# Patient Record
Sex: FEMALE | Race: WHITE | URBAN - METROPOLITAN AREA
[De-identification: names, ages, dates, MRNs, and addresses within clinical notes are randomized per-mention and may not be internally consistent; named-entity substitution may affect disease eponyms.]

---

## 2018-04-16 ASSESSMENT — ENCOUNTER SYMPTOMS
SHORTNESS OF BREATH: 0
MUSCLE CRAMPS: 0
DYSPNEA ON EXERTION: 0
SPUTUM PRODUCTION: 1
HOARSE VOICE: 0
TASTE DISTURBANCE: 0
POSTURAL DYSPNEA: 0
MUSCLE WEAKNESS: 0
SMELL DISTURBANCE: 0
WHEEZING: 0
MYALGIAS: 1
NECK MASS: 0
HEMOPTYSIS: 0
TROUBLE SWALLOWING: 0
SNORES LOUDLY: 0
JOINT SWELLING: 0
SINUS PAIN: 0
COUGH DISTURBING SLEEP: 0
SINUS CONGESTION: 1
BACK PAIN: 0
COUGH: 1
STIFFNESS: 1
NECK PAIN: 1
ARTHRALGIAS: 0

## 2018-04-18 ENCOUNTER — OFFICE VISIT (OUTPATIENT)
Dept: SURGERY | Facility: CLINIC | Age: 33
End: 2018-04-18
Payer: COMMERCIAL

## 2018-04-18 ENCOUNTER — APPOINTMENT (OUTPATIENT)
Dept: SURGERY | Facility: CLINIC | Age: 33
End: 2018-04-18
Payer: COMMERCIAL

## 2018-04-18 ENCOUNTER — ANESTHESIA EVENT (OUTPATIENT)
Dept: SURGERY | Facility: AMBULATORY SURGERY CENTER | Age: 33
End: 2018-04-18

## 2018-04-18 ENCOUNTER — ALLIED HEALTH/NURSE VISIT (OUTPATIENT)
Dept: SURGERY | Facility: CLINIC | Age: 33
End: 2018-04-18
Payer: COMMERCIAL

## 2018-04-18 ENCOUNTER — OFFICE VISIT (OUTPATIENT)
Dept: ORTHOPEDICS | Facility: CLINIC | Age: 33
End: 2018-04-18
Payer: COMMERCIAL

## 2018-04-18 VITALS
RESPIRATION RATE: 18 BRPM | DIASTOLIC BLOOD PRESSURE: 74 MMHG | BODY MASS INDEX: 23.78 KG/M2 | SYSTOLIC BLOOD PRESSURE: 122 MMHG | TEMPERATURE: 98.1 F | WEIGHT: 148 LBS | HEART RATE: 63 BPM | OXYGEN SATURATION: 100 % | HEIGHT: 66 IN

## 2018-04-18 VITALS — HEIGHT: 66 IN | WEIGHT: 150.6 LBS | BODY MASS INDEX: 24.2 KG/M2

## 2018-04-18 DIAGNOSIS — M79.89 SOFT TISSUE MASS: Primary | ICD-10-CM

## 2018-04-18 DIAGNOSIS — Z01.818 PREOP EXAMINATION: Primary | ICD-10-CM

## 2018-04-18 DIAGNOSIS — M79.89 SOFT TISSUE MASS: ICD-10-CM

## 2018-04-18 PROBLEM — M20.42 HAMMER TOE OF LEFT FOOT: Status: ACTIVE | Noted: 2017-02-16

## 2018-04-18 PROBLEM — D36.10 NEUROMA: Status: ACTIVE | Noted: 2017-02-16

## 2018-04-18 RX ORDER — VITAMIN E 268 MG
1000 CAPSULE ORAL AT BEDTIME
COMMUNITY

## 2018-04-18 RX ORDER — IBUPROFEN 400 MG/1
400 TABLET, FILM COATED ORAL PRN
COMMUNITY
Start: 2017-04-05

## 2018-04-18 RX ORDER — SCOLOPAMINE TRANSDERMAL SYSTEM 1 MG/1
1 PATCH, EXTENDED RELEASE TRANSDERMAL ONCE
Status: CANCELLED | OUTPATIENT
Start: 2018-04-18 | End: 2018-04-18

## 2018-04-18 ASSESSMENT — LIFESTYLE VARIABLES: TOBACCO_USE: 0

## 2018-04-18 ASSESSMENT — ENCOUNTER SYMPTOMS: ORTHOPNEA: 0

## 2018-04-18 NOTE — PATIENT INSTRUCTIONS
Preparing for Your Surgery      Name:  Lucy Benson   MRN:  [id number]   :  1985   Today's Date:  2018     Arriving for surgery:  Surgery date:  18  Arrival time:  10:20 am    Please come to:   [facility]  [address] [place] [phone]     Parking is available in front of the Clinics and Surgery Center building from 5:30AM to 8:00PM.  -  Proceed to the 5th floor to check into the Ambulatory Surgery Center.              >> There will be patient concierges on the 1st and 5th floor, for assistance or an escort, if you would like.              >> Please call [phone] with any questions.  -  Bring your ID and insurance card.    What can I eat or drink?  -  You may have solid food or milk products until 8 hours prior to your surgery. (3:50 am )  -  You may have water, apple juice or 7up/Sprite until 2 hours prior to your surgery. (9:50 am )    Which medicines can I take?       -  Do not bring your own medications to the hospital.        -  Follow Orthopedic Clinic instructions regarding Ibuprofen. If no instructions given, NO Ibuprofen the day prior to surgery.     -  Do NOT take these medications in the morning, the day of surgery:  Vitamin D3    -  Please take these medications the morning of surgery:  Acetaminophen (Tylenol) if needed    How do I prepare myself?  -  Take two showers: one the night before surgery; and one the morning of surgery.         Use Scrubcare or Hibiclens to wash from neck down.  You may use your own shampoo and conditioner. No other hair products.   -  Do NOT use lotion, powder, colognes, deodorant, or antiperspirant the day of your surgery.  -  Do NOT wear any makeup, fingernail polish or jewelry.    Questions or Concerns:  If you have questions or concerns, please call the  Preoperative Assessment Center, Monday-Friday 7AM-7PM:  [phone]    AFTER YOUR SURGERY  Breathing exercises   Breathing exercises help you recover  faster. Take deep breaths and let the air out slowly. This will:     Help you wake up after surgery.    Help prevent complications like pneumonia.  Preventing complications will help you go home sooner.   Nausea and vomiting   You may feel sick to your stomach after surgery; if so, let your nurse know.    Pain control:  After surgery, you may have pain. Our goal is to help you manage your pain. Pain medicine will help you feel comfortable enough to do activities that will help you heal.  These activities may include breathing exercises, walking and physical therapy.   To help your health care team treat your pain we will ask: 1) If you have pain  2) where it is located 3) describe your pain in your words  Methods of pain control include medications given by mouth, vein or by nerve block for some surgeries.  Sequential Compression Device (SCD) or Pneumo Boots:  You may need to wear SCD S on your legs or feet. These are wraps connected to a machine that pumps in air and releases it. The repeated pumping helps prevent blood clots from forming.

## 2018-04-18 NOTE — PROGRESS NOTES
Orthopedic Surgery Consult / History and Physical    Primary care provider: Mohini Santos           Chief Concern:   Right calf lesion           History of Present Illness:   This patient is a 32 year old female with history of rheumatoid arthritis who presents for evaluation of the above.    Reports insidious onset pain in January of this year.  The pain slowly progressed up until the end of March.  She sought physician care at that time.  She had an ultrasound of her calf at that point which did show a hypoechoic structure approximately 1 cm in size in the medial aspect of the lateral gastrocnemius head.  She reports that the pain got worse for a period of days after that, however that improved.  The pain has been at a much lower level since that time.  The lesion was subsequently categorized with an MRI which was nonspecific and the patient was subsequently referred to our clinic for evaluation.    Mild pain, pain is been stable.  Does not limit her activities in any ways.  Is mildly more sore after she does activities such as stair climbing or extended walking.  Achy in nature.  Not taking any pain medication at present.                 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    Social History   Substance Use Topics     Smoking status: Never Smoker     Smokeless tobacco: Never Used     Alcohol use No            Family History:   Denies family history of bleeding or clotting disorders  Family History   Problem Relation Age of Onset     DIABETES Father      TYPE 2     Obesity Father      Hypertension Mother      Other Cancer Other      Rheumatoid Arthritis Maternal Grandmother             Allergies:   No Known Allergies         Medications:    Anticoagulants: None  Current Outpatient Prescriptions   Medication     Cholecalciferol (VITAMIN D3) 1000 units CAPS     Flaxseed, Linseed, (FLAXSEED OIL) 1000 MG CAPS     ibuprofen (ADVIL/MOTRIN) 400 MG tablet     No current facility-administered medications for this visit.               Physical Exam:   General: awake, alert, cooperative, no apparent distress, appears stated age  HEENT: normal  Respiratory: breathing non-labored  Cardiovascular: peripheral pulses palpable and symmetric, capillary refill < 2sec  Skin: no rashes or lesions  Neurological: CN II-XII grossly intact  Musculoskeletal:     RLE   Skin C/D/I   No visible or palpable masses along the posterior aspect of the leg   No sites of any significant tenderness throughout both gastrocnemius heads   Motor: EHL, TA, FHL, GS 5/5   SILT on SP, DP, S, S, and T nerve territories   Circulation: palpable DP and TP, foot warm   No pain with plantarflexion and knee extension             Data:   Imagin2018 MRI right leg independently reviewed.  This does show a 6 mm homogenously enhancing lesion in the lateral head of the gastrocnemius without significant soft tissue reaction around it.  Fatty infiltration around the lesion. Hemangioma likely diagnosis.             Assessment and Plan:   Assessment:  32-year-old female with a right leg lateral gastroc head likely hemangioma.     Plan:  1. We discussed the options would be either observation with a repeat MRI in 3-4 months, or alternatively an excisional biopsy.  2. The patient reports that for her peace of mind as well as the consistent discomfort bothers her, she would prefer removal.  We will endeavor to set this up in the near future.            Seen with Dr. Avery, documentation by:    Lexx Brown MD  Orthopaedic Surgery PGY-4        Answers for HPI/ROS submitted by the patient on 2018   General Symptoms: No  Skin Symptoms: No  HENT Symptoms: Yes  EYE SYMPTOMS: No  HEART SYMPTOMS: No  LUNG  SYMPTOMS: Yes  INTESTINAL SYMPTOMS: No  URINARY SYMPTOMS: No  GYNECOLOGIC SYMPTOMS: No  BREAST SYMPTOMS: No  SKELETAL SYMPTOMS: Yes  BLOOD SYMPTOMS: No  NERVOUS SYSTEM SYMPTOMS: No  MENTAL HEALTH SYMPTOMS: No  Ear pain: Yes  Ear discharge: No  Hearing loss: No  Tinnitus: Yes  Nosebleeds: No  Congestion: Yes  Sinus pain: No  Trouble swallowing: No   Voice hoarseness: No  Mouth sores: No  Tooth pain: No  Gum tenderness: No  Bleeding gums: No  Change in taste: No  Change in sense of smell: No  Dry mouth: No  Hearing aid used: No  Neck lump: No  Cough: Yes  Sputum or phlegm: Yes  Coughing up blood: No  Difficulty breating or shortness of breath: No  Snoring: No  Wheezing: No  Difficulty breathing on exertion: No  Nighttime Cough: No  Difficulty breathing when lying flat: No  Back pain: No  Muscle aches: Yes  Neck pain: Yes  Swollen joints: No  Joint pain: No  Bone pain: No  Muscle cramps: No  Muscle weakness: No  Joint stiffness: Yes  Bone fracture: No

## 2018-04-18 NOTE — NURSING NOTE
"Reason For Visit:   Chief Complaint   Patient presents with     Consult     Right Calf Mass.        Pain Assessment  Patient Currently in Pain: Yes  0-10 Pain Scale: 2  Primary Pain Location: Leg  Pain Orientation: Right  Pain Descriptors: Discomfort  Alleviating Factors: Rest, NSAIDS  Aggravating Factors: Movement, Standing, Walking               HEIGHT: 5' 6\", WEIGHT: 150 lbs 9.6 oz, BMI: Body mass index is 24.31 kg/(m^2).      Current Outpatient Prescriptions   Medication Sig Dispense Refill     Cholecalciferol (VITAMIN D3) 1000 units CAPS Take 1,000 Units by mouth       Flaxseed, Linseed, (FLAXSEED OIL) 1000 MG CAPS Take 1,000 mg by mouth       ibuprofen (ADVIL/MOTRIN) 400 MG tablet Take 400 mg by mouth          No Known Allergies            "

## 2018-04-18 NOTE — MR AVS SNAPSHOT
After Visit Summary   2018    Lucy Benson    MRN: [id number]           Patient Information     Date Of Birth          1985        Visit Information        Provider Department      2018 12:30 PM Rn, [facility] Preoperative Assessment Center        Care Instructions    Preparing for Your Surgery      Name:  Lucy Benson   MRN:  [id number]   :  1985   Today's Date:  2018     Arriving for surgery:  Surgery date:  18  Arrival time:  10:20 am    Please come to:   [facility]  [address] [place] [phone]    [employer] Parking is available in front of the Clinics and Surgery Center building from 5:30AM to 8:00PM.  -  Proceed to the 5th floor to check into the Ambulatory Surgery Center.              >> There will be patient concierges on the 1st and 5th floor, for assistance or an escort, if you would like.              >> Please call [phone] with any questions.  -  Bring your ID and insurance card.    What can I eat or drink?  -  You may have solid food or milk products until 8 hours prior to your surgery. (3:50 am )  -  You may have water, apple juice or 7up/Sprite until 2 hours prior to your surgery. (9:50 am )    Which medicines can I take?       -  Do not bring your own medications to the hospital.        -  Follow Orthopedic Clinic instructions regarding Ibuprofen. If no instructions given, NO Ibuprofen the day prior to surgery.     -  Do NOT take these medications in the morning, the day of surgery:  Vitamin D3    -  Please take these medications the morning of surgery:  Acetaminophen (Tylenol) if needed    How do I prepare myself?  -  Take two showers: one the night before surgery; and one the morning of surgery.         Use Scrubcare or Hibiclens to wash from neck down.  You may use your own shampoo and conditioner. No other hair products.   -  Do NOT use lotion, powder, colognes, deodorant, or antiperspirant the day  of your surgery.  -  Do NOT wear any makeup, fingernail polish or jewelry.    Questions or Concerns:  If you have questions or concerns, please call the  Preoperative Assessment Center, Monday-Friday 7AM-7PM:  [phone]    AFTER YOUR SURGERY  Breathing exercises   Breathing exercises help you recover faster. Take deep breaths and let the air out slowly. This will:     Help you wake up after surgery.    Help prevent complications like pneumonia.  Preventing complications will help you go home sooner.   Nausea and vomiting   You may feel sick to your stomach after surgery; if so, let your nurse know.    Pain control:  After surgery, you may have pain. Our goal is to help you manage your pain. Pain medicine will help you feel comfortable enough to do activities that will help you heal.  These activities may include breathing exercises, walking and physical therapy.   To help your health care team treat your pain we will ask: 1) If you have pain  2) where it is located 3) describe your pain in your words  Methods of pain control include medications given by mouth, vein or by nerve block for some surgeries.  Sequential Compression Device (SCD) or Pneumo Boots:  You may need to wear SCD S on your legs or feet. These are wraps connected to a machine that pumps in air and releases it. The repeated pumping helps prevent blood clots from forming.                     Follow-ups after your visit        Your next 10 appointments already scheduled     Apr 18, 2018 12:30 PM CDT   (Arrive by 12:15 PM)   PAC RN ASSESSMENT with Aury Pac Rn   [facility] Preoperative [facility] ([facility])    [address] [place] [id number] [phone]            Apr 18, 2018  1:00 PM CDT   (Arrive by 12:45 PM)   PAC Anesthesia Consult with Aury Pac Anesthesiologist   [facility] Preoperative Assessment Center ([facility])    [address] [place] [address]  [place] [id number] [phone]            Apr 18, 2018  1:15 PM CDT   LAB with  LAB   [facility] ([facility])    [address] [place] [address]  1st Floor  [facility] [address] [phone]           Please do not eat 10-12 hours before your appointment if you are coming in fasting for labs on lipids, cholesterol, or glucose (sugar). This does not apply to pregnant women. Water, hot tea and black coffee (with nothing added) are okay. Do not drink other fluids, diet soda or chew gum.            Apr 19, 2018   Procedure with Jose Avery MD   [facility] ([facility])    [address] [place] [address]  5th Floor  [facility] [address] [phone]           Located in the Clinics and Surgery Center at [address] [place] [address].   parking is very convenient and highly recommended.  is a $6 flat rate fee.  Both  and self parkers should enter the main arrival plaza from [facility]; parking attendants will direct you based on your parking preference.              Who to contact     Please call your clinic at [phone] to:    Ask questions about your health    Make or cancel appointments    Discuss your medicines    Learn about your test results    Speak to your doctor            Additional Information About Your Visit        [employer] Information     [employer] gives you secure access to your electronic health record. If you see a primary care provider, you can also send messages to your care team and make appointments. If you have questions, please call your primary care clinic.  If you do not have a primary care provider, please call [phone] and they will assist you.      [employer] is an electronic gateway that provides easy, online access to your medical records. With [employer], you can request a clinic appointment, read your test results, renew a prescription or communicate with your care team.     To  access your existing account, please contact your [facility] or call [phone] for assistance.        Care EveryWhere ID     This is your Care EveryWhere ID. This could be used by other organizations to access your [place] medical records  [id number]         Blood Pressure from Last 3 Encounters:   04/18/18 122/74    Weight from Last 3 Encounters:   04/18/18 67.1 kg (148 lb)   04/18/18 68.3 kg (150 lb 9.6 oz)              Today, you had the following     No orders found for display       Primary Care Provider Office Phone # Fax #    Mohini Santos [phone]       [facility] [address] [place] [address]        Equal Access to Services     MARK SHER : Hadii aad marlys hadasho Sokayali, waaxda luqadaha, qaybta kaalmada adeegyada, waxay annika berman . So [facility] [phone].    ATENCIÓN: Si habla español, tiene a white disposición servicios gratuitos de asistencia lingüística. [facility] [phone].    We comply with applicable federal civil rights laws and [place] laws. We do not discriminate on the basis of race, color, national origin, age, disability, sex, sexual orientation, or gender identity.            Thank you!     Thank you for choosing [facility] PREOPERATIVE [facility]ESSMENT [facility]ENTER  for your care. Our goal is always to provide you with excellent care. Hearing back from our patients is one way we can continue to improve our services. Please take a few minutes to complete the written survey that you may receive in the mail after your visit with us. Thank you!             Your Updated Medication List - Protect others around you: Learn how to safely use, store and throw away your medicines at www.disposemymeds.org.          This list is accurate as of 4/18/18 12:05 PM.  Always use your most recent med list.                   Brand Name Dispense Instructions for use Diagnosis    flaxseed oil 1000 MG Caps      Take 1,000 mg by mouth At  Bedtime        ibuprofen 400 MG tablet    ADVIL/MOTRIN     Take 400 mg by mouth as needed        MULTIVITAMIN ADULTS Tabs      Take 1 tablet by mouth At Bedtime        vitamin D3 1000 units Caps      Take 1,000 Units by mouth every morning

## 2018-04-18 NOTE — ANESTHESIA PREPROCEDURE EVALUATION
Anesthesia Evaluation     . Pt has had prior anesthetic. Type: General and MAC    History of anesthetic complications   - PONV and motion sickness        ROS/MED HX    ENT/Pulmonary:  - neg pulmonary ROS    (-) tobacco use and recent URI   Neurologic:     (+)neuropathy - Some numbness at the site of the soft tissue mass.  ,     Cardiovascular:  - neg cardiovascular ROS      (-) taking anticoagulants/antiplatelets, PIERRE and orthopnea/PND   METS/Exercise Tolerance:  4 - Raking leaves, gardening   Hematologic:  - neg hematologic  ROS       Musculoskeletal:   (+) arthritis, , , other musculoskeletal- Soft tissue mass over the right calf.        GI/Hepatic:  - neg GI/hepatic ROS       Renal/Genitourinary:  - ROS Renal section negative       Endo:  - neg endo ROS       Psychiatric:  - neg psychiatric ROS       Infectious Disease:  - neg infectious disease ROS       Malignancy:      - no malignancy   Other:    - neg other ROS                 Physical Exam  Normal systems: dental    Airway   Mallampati: I  TM distance: >3 FB  Neck ROM: full    Dental     Cardiovascular   Rhythm and rate: regular and normal  (-) no peripheral edema and no murmur    Pulmonary    breath sounds clear to auscultation    Other findings: 3/30/18: Na 138; K 4.2; Cl 104; Glu 81; BUN 9; Cr 0.72; Ca 10.2; Mag 2.3    3/30/18 Right Lower Extremity Soft Tissue Ultrasound:  FINDINGS: Real-time scanning in the area of pain in the posterior mid left calf shows a hypoechoic structure with in the gastrocnemius muscle. This measures approximately 1 cm and has suspicious features such as an irregular lobular  contour and a taller than wide architecture. Recommend further characterization with contrast-enhanced MRI.            No results found for: WBC, HGB, HCT, PLT, NA, POTASSIUM, CHLORIDE, CO2, BUN, CR, GLC, SED, DD, DIMER, NTBNPI, NTBNP, TROPONIN, TROPI, TROPR, TROPN, AST, ALT, GGT, ALKPHOS, BILITOTAL, BILIDIRECT, CAMPBELL, INR    PAC Discussion and  Assessment    ASA Classification: 2  Case is suitable for: ASC  Anesthetic techniques and relevant risks discussed: GA and GA with regional block for post-op pain control  Invasive monitoring and risk discussed: No  Types:   Possibility and Risk of blood transfusion discussed: No  NPO instructions given:   Additional anesthetic preparation and risks discussed:   Needs early admission to pre-op area:   Other:     PAC Resident/NP Anesthesia Assessment:  Lucy Benson is a 32 year old female scheduled to undergo Surgical Excision of Right Leg Soft Tissue Mass on 4/19/18 with Dr. Reid.    She has the following specific operative considerations:   1.  History of postoperative nausea/vomiting with motion sickness: Recommend use of antiemetic agents in the perioperative period.  Orders entered for a Scopolamine patch to be applied in Pre-Op.  2.  The patient is a relatively young, healthy, physically active, non-smoking female who is preparing for a relatively low-risk surgery.  No further evaluation needed prior to the planned procedure.    Revised Cardiac Risk Index: 0.4% risk of major adverse cardiac event.  VTE risk: 0.26%  AWA risk: Low  PONV risk score= 4.  (If > 2, anti-emetic intervention is recommended.)  Anesthesia considerations: Refer to PAC assessment in the anesthesia records.        Reviewed and Signed by PAC Mid-Level Provider/Resident  Mid-Level Provider/Resident: Caron Simon CNP  Date: 4/18/18  Time:     Attending Anesthesiologist Anesthesia Assessment:        Anesthesiologist:   Date:   Time:   Pass/Fail:   Disposition:     PAC Pharmacist Assessment:        Pharmacist:   Date:   Time:        Anesthesia Plan      History & Physical Review  History and physical reviewed and following examination; no interval change.    ASA Status:  1 .        Plan for General and LMA with Intravenous induction. Maintenance will be TIVA.    PONV prophylaxis:  Ondansetron (or other 5HT-3), Dexamethasone or Solumedrol  and Scopolamine patch    -TIVA, Zofran, Decadron, and Scopolamine patch for PONV      Susana Osborne MD  CA 3 Resident  Pager [phone]    I have reviewed the medical record and examined the patient  I agree with the plan of Dr. Osborne.    Maurisio Ypa MD      Postoperative Care  Postoperative pain management:  Multi-modal analgesia.      Consents  Anesthetic plan, risks, benefits and alternatives discussed with:  Patient..                          .

## 2018-04-18 NOTE — H&P
Pre-Operative H & P     Date of Encounter: 4/18/2018  Primary Care Physician:  Mohini Santos    CC: Right calf soft tissue mass.    HPI:  Lucy Benson is a 32 year old female who presents for pre-operative H & P in preparation for Surgical Excision of Right Leg Soft Tissue Mass on 4/19/18 with Dr. Reid at [facility].   The patient was evaluated by Dr. Avery on 4/18/18 in regards to a right calf mass.  She first noted pain in her right calf in January of this year.  She was evaluated by Primary Care, and ultrasound showed a hypoechoic structure approximately 1 cm in size in the medial aspect of the lateral gastrocnemius head.  This was further evaluated by MRI.  She was then referred to Orthopedic Surgery.  She reported that she continues to experience mild pain, somewhat worse after extended walking or stair climbing.  She was offered continued observation with repeat MRI in 3-4 months, or excisional biopsy.  She has decided to proceed with surgery and arrangements are being made for the above procedure.    History is obtained from the patient and the medical record.    Past Medical History:  Past Medical History:   Diagnosis Date     History of ectopic pregnancy      PONV (postoperative nausea and vomiting)      RA (rheumatoid arthritis) (H) 2013     Soft tissue mass     Right leg.     Past Surgical History:  Past Surgical History:   Procedure Laterality Date     DENTAL SURGERY      [place] teeth extraction     DENTAL SURGERY      Gingival surgery     ENT SURGERY      Septum implant     FOOT SURGERY      Right foot hammertoe repair     ORTHOPEDIC SURGERY      ORIF Right humerus fracture.     Hx of Blood transfusions/reactions: Denies.    Hx of abnormal bleeding or anti-platelet use: Denies.    Menstrual history: Patient's last menstrual period was 03/27/2018 (exact date).    Steroid use in the last year: Denies.    Personal or FH of difficulty with anesthesia: PONV, motion  sickness.    Prior to admission medications  Current Outpatient Prescriptions   Medication Sig Dispense Refill     Cholecalciferol (VITAMIN D3) 1000 units CAPS Take 1,000 Units by mouth every morning        Flaxseed, Linseed, (FLAXSEED OIL) 1000 MG CAPS Take 1,000 mg by mouth At Bedtime        Multiple Vitamins-Minerals (MULTIVITAMIN ADULTS) TABS Take 1 tablet by mouth At Bedtime       ibuprofen (ADVIL/MOTRIN) 400 MG tablet Take 400 mg by mouth as needed        Allergies  No Known Allergies    Social History  Social History     Social History     Marital status: Single     Spouse name: N/A     Number of children: N/A     Years of education: N/A     Occupational History     Not on file.     Social History Main Topics     Smoking status: Never Smoker     Smokeless tobacco: Never Used     Alcohol use No     Drug use: No     Sexual activity: No     Other Topics Concern     Not on file     Social History Narrative     Family History  Family History   Problem Relation Age of Onset     DIABETES Father      TYPE 2     Obesity Father      Hypertension Mother      Rheumatoid Arthritis Maternal Grandmother      No Known Problems Brother      No Known Problems Maternal Grandfather      Lung Cancer Paternal Grandmother      Medical History Unknown Paternal Grandfather      Review of Systems  Functional status: Independent in ADL's.  4 METS.     The complete review of systems is negative other than noted in the HPI or here.   Constitutional: Denies recent changes in weight, sleeping patterns, or fevers/chills.  Eyes: Glasses for vision correction.  No recent vision changes.  EENT: Denies recent changes in hearing, mouth pain, or difficulty swallowing.  Cardiovascular: Denies chest pain, PIERRE or orthopnea, or palpitations.  Respiratory: Denies shortness of breath or significant cough.    GI: Denies nausea/vomiting or diarrhea/constipation.    : Denies dysuria.    Musculoskeletal: Denies joint pain or swelling.    Skin: Denies  "rashes or wounds.    Hematologic: Denies easy bruising or bleeding.    Neurologic: Denies migraines, seizures, dizziness.  Reports slight numbness at the site of the right calf soft tissue mass  Psychiatric: Denies changes in mood or affect.      /74 (BP Location: Right arm, Patient Position: Sitting, Cuff Size: Adult Regular)  Pulse 63  Temp 98.1  F (36.7  C) (Oral)  Resp 18  Ht 1.676 m (5' 6\")  Wt 67.1 kg (148 lb)  LMP 03/27/2018 (Exact Date)  SpO2 100%  BMI 23.89 kg/m2    148 lbs 0 oz  5' 6\"   Body mass index is 23.89 kg/(m^2).    Physical Exam  Constitutional: Patient awake, seated upright in a chair, in no apparent distress.  Appears stated age.  Eyes: Pupils equal, round and reactive to light.  Extra ocular muscles intact. Sclera clear.  Conjunctiva normal.  HENT: Head normocephalic.  Oral pharynx intact with moist mucous membranes.  Dentition intact.  No thyromegaly appreciated.   Respiratory: Lung sounds clear to auscultation bilaterally.  No rales, rhonchi, or wheezing noted.    Cardiovascular: S1, S2, regular rate and rhythm.  No murmurs, rubs, or gallops noted. Radial and pedal pulses palpable, bilaterally.  No edema noted.   GI: Bowel sounds present.  Abdomen flat, soft, non-tender to light palpation.  No hepatosplenomegaly or masses palpated.   Genitourinary: Exam deferred.  Lymph/Hematologic: No cervical or supraclavicular lymphadenopathy noted.  No excessive bruising noted.    Skin: Color appropriate for race, warm, dry.  No rashes or wounds at anticipated surgical site.   Musculoskeletal: Full extension of the neck.  No redness, warmth, or swelling of the joints noted. Gross motor strength is normal.    Neurologic: Alert, oriented to name, place and time. Cranial nerves II-XII are grossly intact. Gait is normal.      Neuropsychiatric: Calm, cooperative. Normal affect.     Labs:  3/30/18: Na 138; K 4.2; Cl 104; Glu 81; BUN 9; Cr 0.72; Ca 10.2; Mag 2.3    Imaging:  3/30/18 Right Lower " Extremity Soft Tissue Ultrasound:  FINDINGS: Real-time scanning in the area of pain in the posterior mid left calf shows a hypoechoic structure with in the gastrocnemius muscle. This measures approximately 1 cm and has suspicious features such as an irregular lobular  contour and a taller than wide architecture. Recommend further characterization with contrast-enhanced MRI.    Outside records from [facility] reviewed.    Assessment and Plan  Lucy Benson is a 32 year old female scheduled to undergo Surgical Excision of Right Leg Soft Tissue Mass on 4/19/18 with Dr. Reid.    She has the following specific operative considerations:   1.  History of postoperative nausea/vomiting with motion sickness: Recommend use of antiemetic agents in the perioperative period.  Orders entered for a Scopolamine patch to be applied in Pre-Op.  2.  The patient is a relatively young, healthy, physically active, non-smoking female who is preparing for a relatively low-risk surgery.  No further evaluation needed prior to the planned procedure.    Revised Cardiac Risk Index: 0.4% risk of major adverse cardiac event.  VTE risk: 0.26%  AWA risk: Low  PONV risk score= 4.  (If > 2, anti-emetic intervention is recommended.)  Anesthesia considerations: Refer to PAC assessment in the anesthesia records.    Caron Simon NP  Preoperative Assessment Center  [facility]  Phone: [phone]  Fax: [phone]

## 2018-04-18 NOTE — MR AVS SNAPSHOT
After Visit Summary   4/18/2018    Lucy Benson    MRN: [id number]           Patient Information     Date Of Birth          1985        Visit Information        Provider Department      4/18/2018 10:15 AM Jose Avery MD [facility]        Today's Diagnoses     Soft tissue mass    -  1       Follow-ups after your visit        Your next 10 appointments already scheduled     Apr 18, 2018 11:30 AM CDT   (Arrive by 11:15 AM)   PAC EVALUATION with Uc Pac Lizeth 3   [facility]operative [facility] [place] ([facility])    [address] [place] [id number] [phone]            Apr 18, 2018 12:30 PM CDT   (Arrive by 12:15 PM)   PAC RN ASSESSMENT with Aury Pac Rn   [facility] [place] ([facility])    [address] [place] [id number] [phone]            Apr 18, 2018  1:00 PM CDT   (Arrive by 12:45 PM)   PAC Anesthesia Consult with Aury Pac Anesthesiologist   [facility] [place] ([facility])    [address] [place] [id number] [phone]              Who to contact     Please call your clinic at [phone] to:    Ask questions about your health    Make or cancel appointments    Discuss your medicines    Learn about your test results    Speak to your doctor            Additional Information About Your Visit        MyChart Information     [facility] gives you secure access to your electronic health record. If you see a primary care provider, you can also send messages to your care team and make appointments. If you have questions, please call your primary care clinic.  If you do not have a primary care provider, please call [phone] and they will assist you.      [facility] is an electronic gateway that provides easy, online access to your medical records. With [facility], you can  "request a clinic appointment, read your test results, renew a prescription or communicate with your care team.     To access your existing account, please contact your [facility] or call [phone] for assistance.        Care EveryWhere ID     This is your Care EveryWhere ID. This could be used by other organizations to access your [facility] medical records  [id number]        Your Vitals Were     Height BMI (Body Mass Index)                1.676 m (5' 6\") 24.31 kg/m2           Blood Pressure from Last 3 Encounters:   No data found for BP    Weight from Last 3 Encounters:   04/18/18 68.3 kg (150 lb 9.6 oz)              We Performed the Following     Cheli-Operative Worksheet        Primary Care Provider Office Phone # Fax #    Mohini Santos [phone]       [facility] [address] [address] [facility] [address]        Equal Access to Services     [facility]: Hadii aad ku hadasho Someagan, waaxda luqadaha, qaybta kaalmada adeegyada, veronica roblero hayyesenian chela berman . So [facility] [phone].    ATENCIÓN: Si habla español, tiene a white disposición servicios gratuitos de asistencia lingüística. Dimplehilda al [phone].    We comply with applicable federal civil rights laws and [place] laws. We do not discriminate on the basis of race, color, national origin, age, disability, sex, sexual orientation, or gender identity.            Thank you!     Thank you for choosing [facility]TH[facility]  for your care. Our goal is always to provide you with excellent care. Hearing back from our patients is one way we can continue to improve our services. Please take a few minutes to complete the written survey that you may receive in the mail after your visit with us. Thank you!             Your Updated Medication List - Protect others around you: Learn how to safely use, store and throw away your medicines at www.disposemymeds.org.          This list is accurate as of " 4/18/18 11:16 AM.  Always use your most recent med list.                   Brand Name Dispense Instructions for use Diagnosis    flaxseed oil 1000 MG Caps      Take 1,000 mg by mouth        ibuprofen 400 MG tablet    ADVIL/MOTRIN     Take 400 mg by mouth        vitamin D3 1000 units Caps      Take 1,000 Units by mouth

## 2018-04-18 NOTE — LETTER
4/18/2018       RE: Lucy Benson  [address] [place] [address]     Dear Colleague,    Thank you for referring your patient, Lucy Benson, to the [facility] at [facility]. Please see a copy of my visit note below.    Orthopedic Surgery Consult / History and Physical    Primary care provider: Mohini Santos           Chief Concern:   Right calf lesion           History of Present Illness:   This patient is a 32 year old female with history of rheumatoid arthritis who presents for evaluation of the above.    Reports insidious onset pain in January of this year.  The pain slowly progressed up until the end of March.  She sought physician care at that time.  She had an ultrasound of her calf at that point which did show a hypoechoic structure approximately 1 cm in size in the medial aspect of the lateral gastrocnemius head.  She reports that the pain got worse for a period of days after that, however that improved.  The pain has been at a much lower level since that time.  The lesion was subsequently categorized with an MRI which was nonspecific and the patient was subsequently referred to our clinic for evaluation.    Mild pain, pain is been stable.  Does not limit her activities in any ways.  Is mildly more sore after she does activities such as stair climbing or extended walking.  Achy in nature.  Not taking any pain medication at present.                 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    Social History   Substance Use Topics     Smoking status: Never Smoker     Smokeless tobacco: Never Used     Alcohol use No            Family History:   Denies family history of bleeding  or clotting disorders  Family History   Problem Relation Age of Onset     DIABETES Father      TYPE 2     Obesity Father      Hypertension Mother      Other Cancer Other      Rheumatoid Arthritis Maternal Grandmother             Allergies:   No Known Allergies         Medications:   Anticoagulants: None  Current Outpatient Prescriptions   Medication     Cholecalciferol (VITAMIN D3) 1000 units CAPS     Flaxseed, Linseed, (FLAXSEED OIL) 1000 MG CAPS     ibuprofen (ADVIL/MOTRIN) 400 MG tablet     No current facility-administered medications for this visit.               Physical Exam:   General: awake, alert, cooperative, no apparent distress, appears stated age  HEENT: normal  Respiratory: breathing non-labored  Cardiovascular: peripheral pulses palpable and symmetric, capillary refill < 2sec  Skin: no rashes or lesions  Neurological: CN II-XII grossly intact  Musculoskeletal:     RLE   Skin C/D/I   No visible or palpable masses along the posterior aspect of the leg   No sites of any significant tenderness throughout both gastrocnemius heads   Motor: EHL, TA, FHL, GS 5/5   SILT on SP, DP, S, S, and T nerve territories   Circulation: palpable DP and TP, foot warm   No pain with plantarflexion and knee extension             Data:   Imagin2018 MRI right leg independently reviewed.  This does show a 6 mm homogenously enhancing lesion in the lateral head of the gastrocnemius without significant soft tissue reaction around it.  Fatty infiltration around the lesion. Hemangioma likely diagnosis.             Assessment and Plan:   Assessment:  32-year-old female with a right leg lateral gastroc head likely hemangioma.     Plan:  1. We discussed the options would be either observation with a repeat MRI in 3-4 months, or alternatively an excisional biopsy.  2. The patient reports that for her peace of mind as well as the consistent discomfort bothers her, she would prefer removal.  We will endeavor to set this up in the  near future.    Seen with Dr. Avery, documentation by:    Lexx Brown MD  Orthopaedic Surgery PGY-4     I was present with the resident during the history and exam.  I discussed the case with the resident and agree with the findings as documented in the assessment and plan.    Again, thank you for allowing me to participate in the care of your patient.      Sincerely,    Jose Avery MD

## 2018-04-18 NOTE — NURSING NOTE
Teaching Flowsheet   Relevant Diagnosis: excision right leg mass  Teaching Topic: preop     Person(s) involved in teaching:   Patient     Motivation Level:  Asks Questions: Yes  Eager to Learn: Yes  Cooperative: Yes  Receptive (willing/able to accept information): Yes  Any cultural factors/Yarsanism beliefs that may influence understanding or compliance? No       Patient demonstrates understanding of the following:  Reason for the appointment, diagnosis and treatment plan: Yes  Knowledge of proper use of medications and conditions for which they are ordered (with special attention to potential side effects or drug interactions): Yes  Which situations necessitate calling provider and whom to contact: Yes            Proper use and care of soap (medical equip, care aids, etc.): Yes  Nutritional needs and diet plan: Yes  Pain management techniques: Yes  Wound Care: Yes  How and/when to access community resources: NA     Instructional Materials Used/Given: surgery packet reviewed with patient.  She will obtain H&P today with PAC  She has no other questions or concerns.

## 2018-04-19 ENCOUNTER — SURGERY (OUTPATIENT)
Age: 33
End: 2018-04-19

## 2018-04-19 ENCOUNTER — ANESTHESIA (OUTPATIENT)
Dept: SURGERY | Facility: AMBULATORY SURGERY CENTER | Age: 33
End: 2018-04-19

## 2018-04-19 ENCOUNTER — HOSPITAL ENCOUNTER (OUTPATIENT)
Facility: AMBULATORY SURGERY CENTER | Age: 33
End: 2018-04-19
Attending: ORTHOPAEDIC SURGERY
Payer: COMMERCIAL

## 2018-04-19 VITALS
HEIGHT: 66 IN | SYSTOLIC BLOOD PRESSURE: 107 MMHG | TEMPERATURE: 97.6 F | RESPIRATION RATE: 14 BRPM | DIASTOLIC BLOOD PRESSURE: 68 MMHG | HEART RATE: 60 BPM | WEIGHT: 148 LBS | OXYGEN SATURATION: 99 % | BODY MASS INDEX: 23.78 KG/M2

## 2018-04-19 DIAGNOSIS — M79.89 SOFT TISSUE MASS: Primary | ICD-10-CM

## 2018-04-19 LAB
HCG UR QL: NEGATIVE
INTERNAL QC OK POCT: YES

## 2018-04-19 RX ORDER — SODIUM CHLORIDE, SODIUM LACTATE, POTASSIUM CHLORIDE, CALCIUM CHLORIDE 600; 310; 30; 20 MG/100ML; MG/100ML; MG/100ML; MG/100ML
INJECTION, SOLUTION INTRAVENOUS CONTINUOUS
Status: DISCONTINUED | OUTPATIENT
Start: 2018-04-19 | End: 2018-04-19 | Stop reason: HOSPADM

## 2018-04-19 RX ORDER — FENTANYL CITRATE 50 UG/ML
25-50 INJECTION, SOLUTION INTRAMUSCULAR; INTRAVENOUS
Status: DISCONTINUED | OUTPATIENT
Start: 2018-04-19 | End: 2018-04-19 | Stop reason: HOSPADM

## 2018-04-19 RX ORDER — PROPOFOL 10 MG/ML
INJECTION, EMULSION INTRAVENOUS PRN
Status: DISCONTINUED | OUTPATIENT
Start: 2018-04-19 | End: 2018-04-19

## 2018-04-19 RX ORDER — FENTANYL CITRATE 50 UG/ML
INJECTION, SOLUTION INTRAMUSCULAR; INTRAVENOUS PRN
Status: DISCONTINUED | OUTPATIENT
Start: 2018-04-19 | End: 2018-04-19

## 2018-04-19 RX ORDER — ACETAMINOPHEN 325 MG/1
650 TABLET ORAL
Status: DISCONTINUED | OUTPATIENT
Start: 2018-04-19 | End: 2018-04-20 | Stop reason: HOSPADM

## 2018-04-19 RX ORDER — DEXAMETHASONE SODIUM PHOSPHATE 4 MG/ML
4 INJECTION, SOLUTION INTRA-ARTICULAR; INTRALESIONAL; INTRAMUSCULAR; INTRAVENOUS; SOFT TISSUE EVERY 10 MIN PRN
Status: DISCONTINUED | OUTPATIENT
Start: 2018-04-19 | End: 2018-04-20 | Stop reason: HOSPADM

## 2018-04-19 RX ORDER — SODIUM CHLORIDE, SODIUM LACTATE, POTASSIUM CHLORIDE, CALCIUM CHLORIDE 600; 310; 30; 20 MG/100ML; MG/100ML; MG/100ML; MG/100ML
INJECTION, SOLUTION INTRAVENOUS CONTINUOUS
Status: DISCONTINUED | OUTPATIENT
Start: 2018-04-19 | End: 2018-04-20 | Stop reason: HOSPADM

## 2018-04-19 RX ORDER — ACETAMINOPHEN 325 MG/1
975 TABLET ORAL ONCE
Status: COMPLETED | OUTPATIENT
Start: 2018-04-19 | End: 2018-04-19

## 2018-04-19 RX ORDER — GABAPENTIN 300 MG/1
300 CAPSULE ORAL ONCE
Status: COMPLETED | OUTPATIENT
Start: 2018-04-19 | End: 2018-04-19

## 2018-04-19 RX ORDER — OXYCODONE HYDROCHLORIDE 5 MG/1
5 TABLET ORAL EVERY 4 HOURS PRN
Status: DISCONTINUED | OUTPATIENT
Start: 2018-04-19 | End: 2018-04-20 | Stop reason: HOSPADM

## 2018-04-19 RX ORDER — SCOLOPAMINE TRANSDERMAL SYSTEM 1 MG/1
1 PATCH, EXTENDED RELEASE TRANSDERMAL ONCE
Status: COMPLETED | OUTPATIENT
Start: 2018-04-19 | End: 2018-04-19

## 2018-04-19 RX ORDER — LIDOCAINE 40 MG/G
CREAM TOPICAL
Status: DISCONTINUED | OUTPATIENT
Start: 2018-04-19 | End: 2018-04-19 | Stop reason: HOSPADM

## 2018-04-19 RX ORDER — OXYCODONE HYDROCHLORIDE 5 MG/1
5-10 TABLET ORAL EVERY 4 HOURS PRN
Qty: 20 TABLET | Refills: 0 | Status: SHIPPED | OUTPATIENT
Start: 2018-04-19

## 2018-04-19 RX ORDER — ONDANSETRON 2 MG/ML
INJECTION INTRAMUSCULAR; INTRAVENOUS PRN
Status: DISCONTINUED | OUTPATIENT
Start: 2018-04-19 | End: 2018-04-19

## 2018-04-19 RX ORDER — DEXAMETHASONE SODIUM PHOSPHATE 10 MG/ML
INJECTION, SOLUTION INTRAMUSCULAR; INTRAVENOUS PRN
Status: DISCONTINUED | OUTPATIENT
Start: 2018-04-19 | End: 2018-04-19

## 2018-04-19 RX ORDER — HYDROMORPHONE HYDROCHLORIDE 1 MG/ML
.3-.5 INJECTION, SOLUTION INTRAMUSCULAR; INTRAVENOUS; SUBCUTANEOUS EVERY 10 MIN PRN
Status: DISCONTINUED | OUTPATIENT
Start: 2018-04-19 | End: 2018-04-20 | Stop reason: HOSPADM

## 2018-04-19 RX ORDER — OXYCODONE HYDROCHLORIDE 5 MG/1
5 TABLET ORAL
Status: DISCONTINUED | OUTPATIENT
Start: 2018-04-19 | End: 2018-04-20 | Stop reason: HOSPADM

## 2018-04-19 RX ORDER — BUPIVACAINE HYDROCHLORIDE 2.5 MG/ML
INJECTION, SOLUTION INFILTRATION; PERINEURAL PRN
Status: DISCONTINUED | OUTPATIENT
Start: 2018-04-19 | End: 2018-04-19 | Stop reason: HOSPADM

## 2018-04-19 RX ORDER — LIDOCAINE HYDROCHLORIDE 20 MG/ML
INJECTION, SOLUTION INFILTRATION; PERINEURAL PRN
Status: DISCONTINUED | OUTPATIENT
Start: 2018-04-19 | End: 2018-04-19

## 2018-04-19 RX ORDER — ONDANSETRON 4 MG/1
4 TABLET, ORALLY DISINTEGRATING ORAL
Status: DISCONTINUED | OUTPATIENT
Start: 2018-04-19 | End: 2018-04-20 | Stop reason: HOSPADM

## 2018-04-19 RX ORDER — ONDANSETRON 2 MG/ML
4 INJECTION INTRAMUSCULAR; INTRAVENOUS EVERY 30 MIN PRN
Status: DISCONTINUED | OUTPATIENT
Start: 2018-04-19 | End: 2018-04-20 | Stop reason: HOSPADM

## 2018-04-19 RX ORDER — SCOLOPAMINE TRANSDERMAL SYSTEM 1 MG/1
1 PATCH, EXTENDED RELEASE TRANSDERMAL
Status: DISCONTINUED | OUTPATIENT
Start: 2018-04-19 | End: 2018-04-19 | Stop reason: HOSPADM

## 2018-04-19 RX ORDER — ACETAMINOPHEN AND CODEINE PHOSPHATE 300; 30 MG/1; MG/1
1-2 TABLET ORAL EVERY 4 HOURS PRN
Qty: 20 TABLET | Refills: 0 | Status: SHIPPED | OUTPATIENT
Start: 2018-04-19

## 2018-04-19 RX ORDER — ALBUTEROL SULFATE 0.83 MG/ML
2.5 SOLUTION RESPIRATORY (INHALATION) EVERY 4 HOURS PRN
Status: DISCONTINUED | OUTPATIENT
Start: 2018-04-19 | End: 2018-04-19 | Stop reason: HOSPADM

## 2018-04-19 RX ORDER — FENTANYL CITRATE 50 UG/ML
25-50 INJECTION, SOLUTION INTRAMUSCULAR; INTRAVENOUS
Status: DISCONTINUED | OUTPATIENT
Start: 2018-04-19 | End: 2018-04-20 | Stop reason: HOSPADM

## 2018-04-19 RX ORDER — NALOXONE HYDROCHLORIDE 0.4 MG/ML
.1-.4 INJECTION, SOLUTION INTRAMUSCULAR; INTRAVENOUS; SUBCUTANEOUS
Status: DISCONTINUED | OUTPATIENT
Start: 2018-04-19 | End: 2018-04-20 | Stop reason: HOSPADM

## 2018-04-19 RX ORDER — PROPOFOL 10 MG/ML
INJECTION, EMULSION INTRAVENOUS CONTINUOUS PRN
Status: DISCONTINUED | OUTPATIENT
Start: 2018-04-19 | End: 2018-04-19

## 2018-04-19 RX ORDER — ONDANSETRON 4 MG/1
4 TABLET, ORALLY DISINTEGRATING ORAL EVERY 30 MIN PRN
Status: DISCONTINUED | OUTPATIENT
Start: 2018-04-19 | End: 2018-04-20 | Stop reason: HOSPADM

## 2018-04-19 RX ORDER — MEPERIDINE HYDROCHLORIDE 25 MG/ML
12.5 INJECTION INTRAMUSCULAR; INTRAVENOUS; SUBCUTANEOUS
Status: DISCONTINUED | OUTPATIENT
Start: 2018-04-19 | End: 2018-04-20 | Stop reason: HOSPADM

## 2018-04-19 RX ORDER — KETOROLAC TROMETHAMINE 30 MG/ML
INJECTION, SOLUTION INTRAMUSCULAR; INTRAVENOUS PRN
Status: DISCONTINUED | OUTPATIENT
Start: 2018-04-19 | End: 2018-04-19

## 2018-04-19 RX ORDER — MAGNESIUM HYDROXIDE 1200 MG/15ML
LIQUID ORAL PRN
Status: DISCONTINUED | OUTPATIENT
Start: 2018-04-19 | End: 2018-04-19 | Stop reason: HOSPADM

## 2018-04-19 RX ADMIN — PROPOFOL 30 MG: 10 INJECTION, EMULSION INTRAVENOUS at 12:42

## 2018-04-19 RX ADMIN — SODIUM CHLORIDE, SODIUM LACTATE, POTASSIUM CHLORIDE, CALCIUM CHLORIDE: 600; 310; 30; 20 INJECTION, SOLUTION INTRAVENOUS at 11:11

## 2018-04-19 RX ADMIN — ACETAMINOPHEN 975 MG: 325 TABLET ORAL at 11:08

## 2018-04-19 RX ADMIN — BUPIVACAINE HYDROCHLORIDE 10 ML: 2.5 INJECTION, SOLUTION INFILTRATION; PERINEURAL at 13:07

## 2018-04-19 RX ADMIN — KETOROLAC TROMETHAMINE 30 MG: 30 INJECTION, SOLUTION INTRAMUSCULAR; INTRAVENOUS at 13:01

## 2018-04-19 RX ADMIN — Medication 20 MG: at 12:22

## 2018-04-19 RX ADMIN — PROPOFOL 200 MCG/KG/MIN: 10 INJECTION, EMULSION INTRAVENOUS at 12:22

## 2018-04-19 RX ADMIN — PROPOFOL 200 MG: 10 INJECTION, EMULSION INTRAVENOUS at 12:22

## 2018-04-19 RX ADMIN — GABAPENTIN 300 MG: 300 CAPSULE ORAL at 11:08

## 2018-04-19 RX ADMIN — FENTANYL CITRATE 50 MCG: 50 INJECTION, SOLUTION INTRAMUSCULAR; INTRAVENOUS at 12:36

## 2018-04-19 RX ADMIN — FENTANYL CITRATE 50 MCG: 50 INJECTION, SOLUTION INTRAMUSCULAR; INTRAVENOUS at 12:42

## 2018-04-19 RX ADMIN — ONDANSETRON 4 MG: 2 INJECTION INTRAMUSCULAR; INTRAVENOUS at 12:50

## 2018-04-19 RX ADMIN — DEXAMETHASONE SODIUM PHOSPHATE 4 MG: 10 INJECTION, SOLUTION INTRAMUSCULAR; INTRAVENOUS at 12:22

## 2018-04-19 RX ADMIN — MAGNESIUM HYDROXIDE 250 ML: 1200 LIQUID ORAL at 13:07

## 2018-04-19 RX ADMIN — PROPOFOL 40 MG: 10 INJECTION, EMULSION INTRAVENOUS at 12:48

## 2018-04-19 RX ADMIN — LIDOCAINE HYDROCHLORIDE 100 MG: 20 INJECTION, SOLUTION INFILTRATION; PERINEURAL at 12:22

## 2018-04-19 RX ADMIN — SCOLOPAMINE TRANSDERMAL SYSTEM 1 PATCH: 1 PATCH, EXTENDED RELEASE TRANSDERMAL at 11:09

## 2018-04-19 NOTE — OP NOTE
DATE OF SURGERY: 4/19/2018    PREOPERATIVE DIAGNOSIS: Right calf mass, deep    POSTOPERATIVE DIAGNOSIS: Right calf mass, deep    PROCEDURE: Excision of deep right calf mass, 1.5 cm    SURGEON: Jose Avery MD     ASSISTANT: Janeen Valdez PA-C as an assistant was required to help retract and close the wound, and resident help was not available.    PATIENT HISTORY: This patient has noticed some discomfort in her calf.  An MRI reveals mass superficial part of the lateral gastrocnemius muscle.  She understands the risks of bleeding infection pain numbness tingling and limp.    DESCRIPTION OF PROCEDURE: The patient was placed in a prone position after general anesthesia in the right leg was washed and sterilely prepped and draped.  Patient had marked the spot where she could feel the mass.  We confirm this with a measurement from the lateral aspect of the proximal fibula.  We made an incision over this area in the midpoint of the posterior calf.  After incising skin sharply dividing subcutaneous tissue with cautery we opened up the muscle fascia and identified the medial and lateral gastrocnemius muscles.  We identified the branch of the sural nerve and dissected up and down until we located this abnormality within the lateral gastrocnemius muscle.  I then used cautery and Metzenbaum scissors to excise the palpable mass.  We did notice abnormal fat within the muscle as the MRI had indicated.  After removing the mass was sent to pathology.  We cauterized some small bleeding vessels and copiously irrigated.  The fascia was closed with Vicryl as well as the subcutaneous tissue we closed the skin with Monocryl followed by Steri-Strips and sterile dry dressing.  The patient was turned supine extubated and taken to the recovery room in stable condition.  There were no complications.  I was present for all critical portions of the procedure.  The estimated blood loss is less than 5 mL.    Jose Avery,  MD

## 2018-04-19 NOTE — ANESTHESIA POSTPROCEDURE EVALUATION
Patient: Lucy Benson    Procedure(s):  Excision Right Calf Mass - Wound Class: I-Clean    Diagnosis:Painful Mass  Diagnosis Additional Information: No value filed.    Anesthesia Type:  General, LMA    Note:  Anesthesia Post Evaluation    Patient location during evaluation: PACU  Patient participation: Able to fully participate in evaluation  Level of consciousness: awake and alert  Pain management: adequate  Airway patency: patent  Cardiovascular status: acceptable  Respiratory status: acceptable  Hydration status: acceptable  PONV: none             Last vitals:  Vitals:    04/19/18 1051 04/19/18 1319 04/19/18 1334   BP: 115/75 107/62 100/65   Pulse:  76 67   Resp: 14 13 11   Temp: 36.4  C (97.6  F) 36.9  C (98.4  F) 36.6  C (97.9  F)   SpO2: 99% 100% 100%         Electronically Signed By: Maurisio Yap MD  April 19, 2018  1:43 PM

## 2018-04-19 NOTE — IP AVS SNAPSHOT
[facility]    [address] [place] [id number]    Phone:  [phone]    Fax:  [phone]                                       After Visit Summary   4/19/2018    Lucy Benson    MRN: [id number]           After Visit Summary Signature Page     I have received my discharge instructions, and my questions have been answered. I have discussed any challenges I see with this plan with the nurse or doctor.    ..........................................................................................................................................  Patient/Patient Representative Signature      ..........................................................................................................................................  Patient Representative Print Name and Relationship to Patient    ..................................................               ................................................  Date                                            Time    ..........................................................................................................................................  Reviewed by Signature/Title    ...................................................              ..............................................  Date                                                            Time

## 2018-04-19 NOTE — IP AVS SNAPSHOT
MRN:[id number]                      After Visit Summary   4/19/2018    Lucy Benson    MRN: [id number]           Thank you!     Thank you for choosing [facility] for your care. Our goal is always to provide you with excellent care. Hearing back from our patients is one way we can continue to improve our services. Please take a few minutes to complete the written survey that you may receive in the mail after you visit with us. Thank you!        Patient Information     Date Of Birth          1985        About your hospital stay     You were admitted on:  April 19, 2018 [facility]    You were discharged on:  April 19, 2018       Who to Call     For medical emergencies, please call 911.  For non-urgent questions about your medical care, please call your primary care provider or clinic, [phone]  For questions related to your surgery, please call your surgery clinic        Attending Provider     Provider Specialty    Jose Avery MD Orthopaedic Surgery       Primary Care Provider Office Phone # Fax #    Mohini Santos [phone]      After Care Instructions      Diet as Tolerated       Return to diet before surgery, unless instructed otherwise.            Discharge Instructions       Review outpatient procedure discharge instructions with patient as directed by Provider            Ice to affected area       Ice pack to surgical site every 15 minutes per hour for 24 hours            No Alcohol       For 24 hours post procedure            No driving or operating machinery        until the day after procedure            Notify Provider       CALL YOUR PHYSICIAN IF:  1.  Your pain begins to worsen and is unable to be controlled with your medications.  2.  Excessive redness or drainage of cloudy or bloody material from the wounds (Clear red tinted fluid and some mild drainage should be expected). Drainage of  any kind 5 days after surgery should be reported to the doctor.  3.  You have a temperature elevation greater than 101.5    4.  You have pain, swelling or redness in your calf. You have numbness or weakness in your leg or foot.    You many call your physician at [phone] during business hours.    For after hours or on weekends, you may call the hospital at [phone] and ask for the orthopedic resident on call.            Remove dressing - at 72 hours       OK to shower and get incision wet in 4-5 days.  No soaking in a tub or pool for 2 weeks. Cover as needed for comfort.            Return to clinic       Return to clinic in 2 weeks            Shower        Cover dressing if dressing is not going to be changed today            Weight bearing - As tolerated                 Further instructions from your care team       [facility]  Home Care Following Anesthesia  For 24 hours after surgery:  1. Get plenty of rest.  A responsible adult must stay with you for at least 24 hours after you leave the surgery center.  2. Do not drive or use heavy equipment.  If you have weakness or tingling, don't drive or use heavy equipment until this feeling goes away.   3. Do not drink alcohol.   4. Avoid strenuous or risky activities.  Ask for help when climbing stairs.  5. You may feel lightheaded.  IF so, sit for a few minutes before standing.  Have someone help you get up.   6. If you have nausea (feel sick to your stomach): Drink only clear liquids such as apple juice, ginger ale, broth or 7-Up.  Rest may also help.  Be sure to drink enough fluids.  Move to a regular diet as you feel able.   7. You may have a slight fever.  Call the doctor if your fever is over 100 F (37.7 C) (taken under the tongue) or lasts longer than 24 hours.  8. You may have a dry mouth, a sore throat, muscle aches or trouble sleeping. These should go away after 24 hours.  9. Do not make important or legal  decisions.   If you use hormonal birth control (such as the pill, patch, ring or implants):  You will need a second form of birth control for 7 days (condoms, a diaphragm or contraceptive foam).  While in the surgery center, you received a medicine called Sugammadex.  Hormonal birth control (such as the pill, patch, ring or implants) will not work as well for a week after taking this medicine.         Tips for taking pain medications  To get the best pain relief possible, remember these points:    Take pain medications as directed, before pain becomes severe.    Pain medication can upset your stomach: taking it with food may help.    Constipation is a common side effect of pain medication. Drink plenty of  fluids.    Eat foods high in fiber. Take a stool softener if recommended by your doctor or pharmacist.    Do not drink alcohol, drive or operate machinery while taking pain medications.    Ask about other ways to control pain, such as with heat, ice or relaxation.    Tylenol/Acetaminophen Consumption  To help encourage the safe use of acetaminophen, the makers of TYLENOL  have lowered the maximum daily dose for single-ingredient Extra Strength TYLENOL  (acetaminophen) products sold in the [place] from 8 pills per day (4,000 mg) to 6 pills per day (3,000 mg). The dosing interval has also changed from 2 pills every 4-6 hours to 2 pills every 6 hours.    If you feel your pain relief is insufficient, you may take Tylenol/Acetaminophen in addition to your narcotic pain medication.     Be careful not to exceed 3,000 mg of Tylenol/Acetaminophen in a 24 hour period from all sources.    If you are taking extra strength Tylenol/acetaminophen (500 mg), the maximum dose is 6 tablets in 24 hours.    If you are taking regular strength acetaminophen (325 mg), the maximum dose is 9 tablets in 24 hours.    Call a doctor for any of the followin. Signs of infection (fever, growing tenderness at the surgery site, a large amount of  "drainage or bleeding, severe pain, foul-smelling drainage, redness, swelling).  2. It has been over 8 to 10 hours since surgery and you are still not able to urinate (pass water).  3. Headache for over 24 hours.  4. Numbness, tingling or weakness the day after surgery (if you had spinal anesthesia).  Your doctor is:       Dr. Jose Avery, Orthopaedics: [phone]               Or dial [phone] and ask for the resident on call for:  Orthopaedics  For emergency care, call the:  [facility] [place] [facility]:  [phone] (TTY for hearing impaired: [phone])  Safety Tips for Using Crutches    Crutch Fit:    Assume good standing posture with shoulders relaxed and crutch tips 6-8 inches out from the side of the foot.    The underarm pad should fall 2-3 fingers width below the armpit.    The handgrip is positioned level with the wrist to allow 30  flexion at the elbow.    Safety Tips:    Bear weight on your hands, not on your armpits.    Do not add extra padding to the underarm pad. This will, in effect, lengthen the crutches and increase risk of nerve injury.    Wear flat, properly fitting shoes. Do not walk in stocking feet, high heels or slippers.    Household hazards:  --Throw rugs should be removed from floors.  --Stairs should be cleared of obstacles.  --Use extra caution on slippery, highly polished, littered or uneven floor surfaces.  --Check for electric cords.    Check crutch tips for excessive wear and keep wing nuts tight.    While walking, look forward with  head up  and  eyes open.  Take equal length steps.    Use BOTH crutches.    Stairs Sequence:    UP: \"Good\" leg first, followed by  bad  leg, then crutches.    DOWN: Crutches, followed by  bad  leg, \"good\" leg.     Walking with Crutches:    Move both crutches forward at the same time.    Non-Weight Bearing (NWB):  Hold the involved leg up and swing through the crutches with the involved leg. The involved leg does not touch the " floor.    Toe Touch Weight Bearing (TTWB): Move the involved leg forward. Rest it lightly on the floor for balance only. Step through the crutches with the uninvolved leg.    Partial Weight Bearing (PWB): Move the involved leg forward. Step down the weight of the leg only.  Step through the crutches with the uninvolved leg.    Weight Bearing As Tolerated (WBAT): Move the involved leg forward. Put as much pressure through the involved leg as you can tolerate comfortably. Then step through the crutches with the uninvolved leg.    Scopolamine Patch- (Absorbed through the skin)    This medicine prevents nausea and vomiting caused by motion sickness or anesthesia.  The medicine is in a patch worn behind the ear.      Do NOT use the Scopolamine Patch if you have glaucoma or are allergic to scopolamine.    How to Use This Medicine:    The patch is applied behind the ear.    Keep the patch dry to prevent it from falling off.  Limit contact with water (no bathing or swimming).      If the patch is loose or falls off throw it away.  You do not need to apply a new patch.    After you take off the patch or if it falls off, wash your hands and the area behind your ear with soap and water.      You can remove the patch tomorrow, or leave on for up to 3 days.    Only one patch should be used at any time.    How to Dispose of This Medicine:    Fold the used patch in half with the sticky sides together. Throw any used patch away so that children or pets cannot get to it. You will also need to throw away old patches after the expiration date has passed.    Keep all medicine away from children and never share your medicine with anyone.    Warnings While Using This Medicine:    This medicine can make you sleepy.  Avoid taking sleeping pills and other medicines that can make you sleepy while the patch is on.    Do not drink alcohol while the patch is on.    This medicine can cause temporary blurring and other vision problems if it comes  in contact with the eyes.  This is not serious unless accompanied by eye pain and redness.     This medicine may cause problems with urination. If you have problems with urinating, remove the patch.  If you are unable to urinate, call your doctor.      This medicine may make you dizzy or drowsy. Avoid driving, using machines, or doing anything else that could be dangerous if the patch is on.    This medicine may make you sweat less and cause your body to get too hot. Be careful in hot weather or if you are exercising.    Make sure any doctor or dentist who treats you knows that you have the patch on. This medicine may affect the results of certain medical tests.    Skin burns have been reported at the patch site in several patients wearing an aluminized transdermal system during a magnetic resonance imaging scan (MRI).  Since this patch contains aluminum, it is recommended to remove the patch if you are having an MRI.    Possible Side Effects While Using This Medicine:    Dry mouth    Drowsiness    Temporary blurring of vision and widening of the pupils    Call your doctor right away if you notice any of these side effects:    Allergic reaction: Itching or hives, swelling in your face or hands, swelling or tingling in your mouth or throat, chest tightness, trouble breathing.    Blurred vision that does not go away after the patch is removed    Confusion or memory loss    Fast,slow, or uneven heartbeat    Lightheadedness, dizziness, drowsiness, or fainting    Seeing, hearing, or feeling things that are not there    Restlessness    Severe eye pain    Trouble urinating    If you notice other side effects that you think are caused by this medicine, call your doctor immediately.                          Additional Information     If you use hormonal birth control (such as the pill, patch, ring or implants): You'll need a second form of birth control for 7 days (condoms, a diaphragm or contraceptive foam). While in the  "hospital, you received a medicine called Bridion. Your normal birth control will not work as well for a week after taking this medicine.          Pending Results     Date and Time Order Name Status Description    4/19/2018 1256 Surgical pathology exam In process             Admission Information     Date & Time Provider Department Dept. Phone    4/19/2018 Jose Avery MD [facility] [phone]      Your Vitals Were     Blood Pressure Pulse Temperature Respirations Height Weight    107/68 60 97.6  F (36.4  C) (Temporal) 14 1.676 m (5' 5.98\") 67.1 kg (148 lb)    Last Period Pulse Oximetry BMI (Body Mass Index)             03/27/2018 (Exact Date) 99% 23.9 kg/m2         [employer] Information     [employer] gives you secure access to your electronic health record. If you see a primary care provider, you can also send messages to your care team and make appointments. If you have questions, please call your primary care clinic.  If you do not have a primary care provider, please call [phone] and they will assist you.      [employer] is an electronic gateway that provides easy, online access to your medical records. With [employer], you can request a clinic appointment, read your test results, renew a prescription or communicate with your care team.     To access your existing account, please contact your [facility] or call [phone] for assistance.        Care EveryWhere ID     This is your Care EveryWhere ID. This could be used by other organizations to access your [facility] medical records  [id number]        Equal Access to Services     MARK SHER : Hadii aad ku hadasho Soomaali, waaxda luqadaha, qaybta kaalmada adeegyada, veronica reeves. So [facility] [phone].    ATENCIÓN: Si habla español, tiene a white disposición servicios gratuitos de asistencia lingüística. Llame al [phone].    We comply with applicable federal civil " rights laws and [place] laws. We do not discriminate on the basis of race, color, national origin, age, disability, sex, sexual orientation, or gender identity.               Review of your medicines      START taking        Dose / Directions    acetaminophen-codeine 300-30 MG per tablet   Commonly known as:  TYLENOL WITH CODEINE #3   Used for:  Soft tissue mass        Dose:  1-2 tablet   Take 1-2 tablets by mouth every 4 hours as needed for mild pain   Quantity:  20 tablet   Refills:  0       oxyCODONE IR 5 MG tablet   Commonly known as:  ROXICODONE   Used for:  Soft tissue mass        Dose:  5-10 mg   Take 1-2 tablets (5-10 mg) by mouth every 4 hours as needed for pain or other (Moderate to Severe)   Quantity:  20 tablet   Refills:  0         CONTINUE these medicines which have NOT CHANGED        Dose / Directions    flaxseed oil 1000 MG Caps        Dose:  1000 mg   Take 1,000 mg by mouth At Bedtime   Refills:  0       ibuprofen 400 MG tablet   Commonly known as:  ADVIL/MOTRIN        Dose:  400 mg   Take 400 mg by mouth as needed   Refills:  0       MULTIVITAMIN ADULTS Tabs        Dose:  1 tablet   Take 1 tablet by mouth At Bedtime   Refills:  0       vitamin D3 1000 units Caps        Dose:  1000 Units   Take 1,000 Units by mouth every morning   Refills:  0            Where to get your medicines      Some of these will need a paper prescription and others can be bought over the counter. Ask your nurse if you have questions.     Bring a paper prescription for each of these medications     acetaminophen-codeine 300-30 MG per tablet    oxyCODONE IR 5 MG tablet                Protect others around you: Learn how to safely use, store and throw away your medicines at www.disposemymeds.org.        Information about OPIOIDS     PRESCRIPTION OPIOIDS: WHAT YOU NEED TO KNOW   You have a prescription for an opioid (narcotic) pain medicine. Opioids can cause addiction. If you have a history of chemical dependency of any  type, you are at a higher risk of becoming addicted to opioids. Only take this medicine after all other options have been tried. Take it for as short a time and as few doses as possible.     Do not:    Drive. If you drive while taking these medicines, you could be arrested for driving under the influence (DUI).    Operate heavy machinery    Do any other dangerous activities while taking these medicines.     Drink any alcohol while taking these medicines.      Take with any other medicines that contain acetaminophen. Read all labels carefully. Look for the word  acetaminophen  or  Tylenol.  Ask your pharmacist if you have questions or are unsure.    Store your pills in a secure place, locked if possible. We will not replace any lost or stolen medicine. If you don t finish your medicine, please throw away (dispose) as directed by your pharmacist. The [place] [employer]ution [employer] has more information about safe disposal: https://www.pca.[facility].[place]/living-green/managing-unwanted-medications    All opioids tend to cause constipation. Drink plenty of water and eat foods that have a lot of fiber, such as fruits, vegetables, prune juice, apple juice and high-fiber cereal. Take a laxative (Miralax, milk of magnesia, Colace, Senna) if you don t move your bowels at least every other day.              Medication List: This is a list of all your medications and when to take them. Check marks below indicate your daily home schedule. Keep this list as a reference.      Medications           Morning Afternoon Evening Bedtime As Needed    acetaminophen-codeine 300-30 MG per tablet   Commonly known as:  TYLENOL WITH CODEINE #3   Take 1-2 tablets by mouth every 4 hours as needed for mild pain                                flaxseed oil 1000 MG Caps   Take 1,000 mg by mouth At Bedtime                                ibuprofen 400 MG tablet   Commonly known as:  ADVIL/MOTRIN   Take 400 mg by mouth as needed                                 MULTIVITAMIN ADULTS Tabs   Take 1 tablet by mouth At Bedtime                                oxyCODONE IR 5 MG tablet   Commonly known as:  ROXICODONE   Take 1-2 tablets (5-10 mg) by mouth every 4 hours as needed for pain or other (Moderate to Severe)                                vitamin D3 1000 units Caps   Take 1,000 Units by mouth every morning

## 2018-04-19 NOTE — DISCHARGE INSTRUCTIONS
[facility]  Home Care Following Anesthesia  For 24 hours after surgery:  1. Get plenty of rest.  A responsible adult must stay with you for at least 24 hours after you leave the surgery center.  2. Do not drive or use heavy equipment.  If you have weakness or tingling, don't drive or use heavy equipment until this feeling goes away.   3. Do not drink alcohol.   4. Avoid strenuous or risky activities.  Ask for help when climbing stairs.  5. You may feel lightheaded.  IF so, sit for a few minutes before standing.  Have someone help you get up.   6. If you have nausea (feel sick to your stomach): Drink only clear liquids such as apple juice, ginger ale, broth or 7-Up.  Rest may also help.  Be sure to drink enough fluids.  Move to a regular diet as you feel able.   7. You may have a slight fever.  Call the doctor if your fever is over 100 F (37.7 C) (taken under the tongue) or lasts longer than 24 hours.  8. You may have a dry mouth, a sore throat, muscle aches or trouble sleeping. These should go away after 24 hours.  9. Do not make important or legal decisions.   If you use hormonal birth control (such as the pill, patch, ring or implants):  You will need a second form of birth control for 7 days (condoms, a diaphragm or contraceptive foam).  While in the surgery center, you received a medicine called Sugammadex.  Hormonal birth control (such as the pill, patch, ring or implants) will not work as well for a week after taking this medicine.         Tips for taking pain medications  To get the best pain relief possible, remember these points:    Take pain medications as directed, before pain becomes severe.    Pain medication can upset your stomach: taking it with food may help.    Constipation is a common side effect of pain medication. Drink plenty of  fluids.    Eat foods high in fiber. Take a stool softener if recommended by your doctor or pharmacist.    Do not drink alcohol,  drive or operate machinery while taking pain medications.    Ask about other ways to control pain, such as with heat, ice or relaxation.    Tylenol/Acetaminophen Consumption  To help encourage the safe use of acetaminophen, the makers of TYLENOL  have lowered the maximum daily dose for single-ingredient Extra Strength TYLENOL  (acetaminophen) products sold in the [place] from 8 pills per day (4,000 mg) to 6 pills per day (3,000 mg). The dosing interval has also changed from 2 pills every 4-6 hours to 2 pills every 6 hours.    If you feel your pain relief is insufficient, you may take Tylenol/Acetaminophen in addition to your narcotic pain medication.     Be careful not to exceed 3,000 mg of Tylenol/Acetaminophen in a 24 hour period from all sources.    If you are taking extra strength Tylenol/acetaminophen (500 mg), the maximum dose is 6 tablets in 24 hours.    If you are taking regular strength acetaminophen (325 mg), the maximum dose is 9 tablets in 24 hours.    Call a doctor for any of the followin. Signs of infection (fever, growing tenderness at the surgery site, a large amount of drainage or bleeding, severe pain, foul-smelling drainage, redness, swelling).  2. It has been over 8 to 10 hours since surgery and you are still not able to urinate (pass water).  3. Headache for over 24 hours.  4. Numbness, tingling or weakness the day after surgery (if you had spinal anesthesia).  Your doctor is:       Dr. Jose Avery, Orthopaedics: [phone]               Or dial [phone] and ask for the resident on call for:  Orthopaedics  For emergency care, call the:  [facility]:  [phone] (TTY for hearing impaired: [phone])  Safety Tips for Using Crutches    Crutch Fit:    Assume good standing posture with shoulders relaxed and crutch tips 6-8 inches out from the side of the foot.    The underarm pad should fall 2-3 fingers width below the armpit.    The handgrip is positioned level  "with the wrist to allow 30  flexion at the elbow.    Safety Tips:    Bear weight on your hands, not on your armpits.    Do not add extra padding to the underarm pad. This will, in effect, lengthen the crutches and increase risk of nerve injury.    Wear flat, properly fitting shoes. Do not walk in stocking feet, high heels or slippers.    Household hazards:  --Throw rugs should be removed from floors.  --Stairs should be cleared of obstacles.  --Use extra caution on slippery, highly polished, littered or uneven floor surfaces.  --Check for electric cords.    Check crutch tips for excessive wear and keep wing nuts tight.    While walking, look forward with  head up  and  eyes open.  Take equal length steps.    Use BOTH crutches.    Stairs Sequence:    UP: \"Good\" leg first, followed by  bad  leg, then crutches.    DOWN: Crutches, followed by  bad  leg, \"good\" leg.     Walking with Crutches:    Move both crutches forward at the same time.    Non-Weight Bearing (NWB):  Hold the involved leg up and swing through the crutches with the involved leg. The involved leg does not touch the floor.    Toe Touch Weight Bearing (TTWB): Move the involved leg forward. Rest it lightly on the floor for balance only. Step through the crutches with the uninvolved leg.    Partial Weight Bearing (PWB): Move the involved leg forward. Step down the weight of the leg only.  Step through the crutches with the uninvolved leg.    Weight Bearing As Tolerated (WBAT): Move the involved leg forward. Put as much pressure through the involved leg as you can tolerate comfortably. Then step through the crutches with the uninvolved leg.    Scopolamine Patch- (Absorbed through the skin)    This medicine prevents nausea and vomiting caused by motion sickness or anesthesia.  The medicine is in a patch worn behind the ear.      Do NOT use the Scopolamine Patch if you have glaucoma or are allergic to scopolamine.    How to Use This Medicine:    The patch is " applied behind the ear.    Keep the patch dry to prevent it from falling off.  Limit contact with water (no bathing or swimming).      If the patch is loose or falls off throw it away.  You do not need to apply a new patch.    After you take off the patch or if it falls off, wash your hands and the area behind your ear with soap and water.      You can remove the patch tomorrow, or leave on for up to 3 days.    Only one patch should be used at any time.    How to Dispose of This Medicine:    Fold the used patch in half with the sticky sides together. Throw any used patch away so that children or pets cannot get to it. You will also need to throw away old patches after the expiration date has passed.    Keep all medicine away from children and never share your medicine with anyone.    Warnings While Using This Medicine:    This medicine can make you sleepy.  Avoid taking sleeping pills and other medicines that can make you sleepy while the patch is on.    Do not drink alcohol while the patch is on.    This medicine can cause temporary blurring and other vision problems if it comes in contact with the eyes.  This is not serious unless accompanied by eye pain and redness.     This medicine may cause problems with urination. If you have problems with urinating, remove the patch.  If you are unable to urinate, call your doctor.      This medicine may make you dizzy or drowsy. Avoid driving, using machines, or doing anything else that could be dangerous if the patch is on.    This medicine may make you sweat less and cause your body to get too hot. Be careful in hot weather or if you are exercising.    Make sure any doctor or dentist who treats you knows that you have the patch on. This medicine may affect the results of certain medical tests.    Skin burns have been reported at the patch site in several patients wearing an aluminized transdermal system during a magnetic resonance imaging scan (MRI).  Since this patch  contains aluminum, it is recommended to remove the patch if you are having an MRI.    Possible Side Effects While Using This Medicine:    Dry mouth    Drowsiness    Temporary blurring of vision and widening of the pupils    Call your doctor right away if you notice any of these side effects:    Allergic reaction: Itching or hives, swelling in your face or hands, swelling or tingling in your mouth or throat, chest tightness, trouble breathing.    Blurred vision that does not go away after the patch is removed    Confusion or memory loss    Fast,slow, or uneven heartbeat    Lightheadedness, dizziness, drowsiness, or fainting    Seeing, hearing, or feeling things that are not there    Restlessness    Severe eye pain    Trouble urinating    If you notice other side effects that you think are caused by this medicine, call your doctor immediately.

## 2018-04-19 NOTE — BRIEF OP NOTE
[facility]    Orthopaedic Surgery  Brief Operative Note    Pre-operative diagnosis: Painful Mass   Post-operative diagnosis Painful mass right calf   Procedure: Procedure(s):  Excision Right Calf Mass - Wound Class: I-Clean   Surgeon: Jose Avery MD   Assistants(s): Janeen Valdez PA-C   Anesthesia: General    Estimated blood loss: Less than 10 ml   Total IV fluids: (See anesthesia record)   Blood transfusion: (See anesthesia record)   Total urine output: (See anesthesia record)   Drains: None   Specimens:   ID Type Source Tests Collected by Time Destination   A : right leg mass Tissue Leg, Right SURGICAL PATHOLOGY EXAM Jose Avery MD 4/19/2018 12:48 PM       Findings: None   Complications: None   Implants: None    Post-op Plan:  WB status:   FWB  Device:  none  DVT Prophylaxis:  Not needed   Follow-up:  2 weeks with Dr. Avery/RUBINA for wound check

## 2018-04-19 NOTE — ANESTHESIA CARE TRANSFER NOTE
Patient: Lucy Benson    Procedure(s):  Excision Right Calf Mass - Wound Class: I-Clean    Diagnosis: Painful Mass  Diagnosis Additional Information: No value filed.    Anesthesia Type:   General, LMA     Note:  Airway :Face Mask  Patient transferred to:PACU  Comments: 107/62 100%  98.4-67-16  Handoff Report: Identifed the Patient, Identified the Reponsible Provider, Reviewed the pertinent medical history, Discussed the surgical course, Reviewed Intra-OP anesthesia mangement and issues during anesthesia, Set expectations for post-procedure period and Allowed opportunity for questions and acknowledgement of understanding      Vitals: (Last set prior to Anesthesia Care Transfer)    CRNA VITALS  4/19/2018 1246 - 4/19/2018 1321      4/19/2018             NIBP: 117/73    Pulse: 104    NIBP Mean: 105    SpO2: 99 %    Resp Rate (set): 10                Electronically Signed By: RAINER Mar CRNA  April 19, 2018  1:21 PM

## 2018-04-25 ENCOUNTER — HEALTH MAINTENANCE LETTER (OUTPATIENT)
Age: 33
End: 2018-04-25

## 2018-04-25 LAB — COPATH REPORT: NORMAL

## 2018-04-26 ENCOUNTER — TELEPHONE (OUTPATIENT)
Dept: ORTHOPEDICS | Facility: CLINIC | Age: 33
End: 2018-04-26

## 2018-04-26 NOTE — TELEPHONE ENCOUNTER
Pt called re: increased foot swelling, decreased ability to straighten leg out in operative side. Pt has some bruising, stiffness. Pt does not feel this could be a blood clot and prefers to be seen tomorrow in clinic. Appointment sched for 11am tomorrow with BARBRA Bañuelos.    Rod Moeller

## 2018-04-26 NOTE — TELEPHONE ENCOUNTER
Returned patient's call regarding her concerns with her leg.  She is s/p calf mass excision on 4/19/2018 with Dr. Avery.  She stated she was doing well until this past Tuesday when she woke up she felt that her entire leg was stiff and she has more swelling in her foot.  She denies any fall or twisting oddly.  I encouraged her to use some sort of compression on her foot area to help with swelling and also to elevate, which she reports she has been doing most of the day anyways.  She is concerned that she may need to be seen sooner than her scheduled follow up, which is 5/9/18.  Patient would like a call back from Lukasz Burden and this message will be routed to her.

## 2018-04-27 ENCOUNTER — RADIANT APPOINTMENT (OUTPATIENT)
Dept: ULTRASOUND IMAGING | Facility: CLINIC | Age: 33
End: 2018-04-27
Attending: PHYSICIAN ASSISTANT
Payer: COMMERCIAL

## 2018-04-27 ENCOUNTER — OFFICE VISIT (OUTPATIENT)
Dept: ORTHOPEDICS | Facility: CLINIC | Age: 33
End: 2018-04-27
Payer: COMMERCIAL

## 2018-04-27 VITALS — WEIGHT: 149.6 LBS | HEIGHT: 66 IN | BODY MASS INDEX: 24.04 KG/M2

## 2018-04-27 DIAGNOSIS — M79.661 RIGHT CALF PAIN: Primary | ICD-10-CM

## 2018-04-27 DIAGNOSIS — M79.661 RIGHT CALF PAIN: ICD-10-CM

## 2018-04-27 NOTE — LETTER
4/27/2018       RE: Lucy Benson  [address] [facility] [address]     Dear Colleague,    Thank you for referring your patient, Lucy Benson, to the [facility]. Please see a copy of my visit note below.    Chief Complaint: Calf pain, right    HPI: Lucy is a 32-year-old female who is here 1 week status post excision of right calf mass by Dr. Avery.  She reports that overall she was doing well and went back to work the next day.  However 2 days ago, she started developing increasing calf pain and bruising down the back of her calf and lower leg.  She denies any trauma.  She did walk around a fair amount this weekend.  She was off her pain medicine but had to restart the pain medicine on Wednesday, as well as using one crutch again.  She denies any other symptoms.  No other concerns.    Physical Exam: Lucy is a pleasant 32-year-old female who is alert and oriented in no apparent distress. She has an antalgic gait with one crutch today.  Her right calf incision is healing well.  Steri-Strips are still in place.  There is no erythema or drainage.  She does have an area of firmness just distal to the incision.  There is significant ecchymosis from that area down towards the Achilles tendon.  She is tender to palpation of the posterior calf.  He has some pain with range of motion of the ankle in the calf area.  There is moderate swelling.  She is otherwise neurovascularly intact.    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  Given the vascular nature of the tumor, it may be more susceptible to bleeding in that area with increased activity.  However, there is a slight  increased risk of DVT with surgery, so I believe we should do a Doppler ultrasound of the right lower extremity to rule out DVT.  I explained what would happen if the results are positive.  If however it is just a hematoma, I do recommend warm moist compresses and gentle stretching, and this should improve with time.  We should see her back in 2 weeks for a check of her progress.  She understands and agrees with the plan of care and all questions have been answered.    ADDENDUM:  Patient's doppler US was negative for DVT.  I did call the patient to confirm that with her.  She doesn't understand why she is still having pain and swelling.  She is frustrated by this set back.  I told her she is only a week out from surgery and she may have been too active, and any time there is bleeding, there is swelling, which causes pain and decreased motion.  She should take it easy this next week and do gentle stretching and warm compresses.  If she doesn't see any improvement in the next several days, she can call and come back in sooner, otherwise we will see her in 2 weeks.  She did not seem happy with this explanation but agreed with the plan of care.    Again, thank you for allowing me to participate in the care of your patient.      Sincerely,    Janeen Valdez PA-C

## 2018-04-27 NOTE — MR AVS SNAPSHOT
"              After Visit Summary   4/27/2018    Lucy Benson    MRN: [id number]           Patient Information     Date Of Birth          1985        Visit Information        Provider Department      4/27/2018 11:00 AM Janeen Valdez PA-C [facility]        Today's Diagnoses     Right calf pain    -  1       Follow-ups after your visit        Your next 10 appointments already scheduled     May 09, 2018 10:30 AM CDT   (Arrive by 10:15 AM)   Return Visit with Jose Avery MD   [facility] ([facility])    [address] [place] [address] [id number] [phone]              Who to contact     Please call your clinic at [phone] to:    Ask questions about your health    Make or cancel appointments    Discuss your medicines    Learn about your test results    Speak to your doctor            Additional Information About Your Visit        MyChart Information     [email or web address] gives you secure access to your electronic health record. If you see a primary care provider, you can also send messages to your care team and make appointments. If you have questions, please call your primary care clinic.  If you do not have a primary care provider, please call [phone] and they will assist you.      [employer] is an electronic gateway that provides easy, online access to your medical records. With [employer], you can request a clinic appointment, read your test results, renew a prescription or communicate with your care team.     To access your existing account, please contact your [facility] or call [phone] for assistance.        Care EveryWhere ID     This is your Care EveryWhere ID. This could be used by other organizations to access your [facility] medical records  [id number]        Your Vitals Were     Height BMI (Body Mass Index)                1.676 m (5' 5.98\") 24.16 kg/m2           " Blood Pressure from Last 3 Encounters:   04/19/18 107/68   04/18/18 122/74    Weight from Last 3 Encounters:   04/27/18 67.9 kg (149 lb 9.6 oz)   04/19/18 67.1 kg (148 lb)   04/18/18 67.1 kg (148 lb)               Primary Care Provider Office Phone # Fax #    Mohini Santos [phone]       [address] [facility] [address]        Equal Access to Services     MARK SHER : Hadii aad ku hadasho Soomaali, waaxda luqadaha, qaybta kaalmada adeegyada, waxay chantalein hayaan adevicky berman . So [facility] [phone].    ATENCIÓN: Si habla español, tiene a white disposición servicios gratuitos de asistencia lingüística. [facility] [phone].    We comply with applicable federal civil rights laws and [place] laws. We do not discriminate on the basis of race, color, national origin, age, disability, sex, sexual orientation, or gender identity.            Thank you!     Thank you for choosing [facility]  for your care. Our goal is always to provide you with excellent care. Hearing back from our patients is one way we can continue to improve our services. Please take a few minutes to complete the written survey that you may receive in the mail after your visit with us. Thank you!             Your Updated Medication List - Protect others around you: Learn how to safely use, store and throw away your medicines at www.disposemymeds.org.          This list is accurate as of 4/27/18  2:13 PM.  Always use your most recent med list.                   Brand Name Dispense Instructions for use Diagnosis    acetaminophen-codeine 300-30 MG per tablet    TYLENOL WITH CODEINE #3    20 tablet    Take 1-2 tablets by mouth every 4 hours as needed for mild pain    Soft tissue mass       flaxseed oil 1000 MG Caps      Take 1,000 mg by mouth At Bedtime        ibuprofen 400 MG tablet    ADVIL/MOTRIN     Take 400 mg by mouth as needed        MULTIVITAMIN ADULTS Tabs      Take 1 tablet by mouth  At Bedtime        oxyCODONE IR 5 MG tablet    ROXICODONE    20 tablet    Take 1-2 tablets (5-10 mg) by mouth every 4 hours as needed for pain or other (Moderate to Severe)    Soft tissue mass       vitamin D3 1000 units Caps      Take 1,000 Units by mouth every morning

## 2018-04-27 NOTE — NURSING NOTE
"Reason For Visit:   Chief Complaint   Patient presents with     Surgical Followup     S/P Excision Right Calf Mass. DOS: 04/19/2018     RECHECK     Pt. states that she started having pain on 04/24/2018, no injury.        Pain Assessment  Patient Currently in Pain: Yes  0-10 Pain Scale: 5  Primary Pain Location: Leg  Pain Orientation: Right  Pain Descriptors: Discomfort, Aching  Alleviating Factors: Rest, Pain medication  Aggravating Factors: Movement, Sitting               HEIGHT: 5' 5.98\", WEIGHT: 149 lbs 9.6 oz, BMI: Body mass index is 24.16 kg/(m^2).      Current Outpatient Prescriptions   Medication Sig Dispense Refill     acetaminophen-codeine (TYLENOL WITH CODEINE #3) 300-30 MG per tablet Take 1-2 tablets by mouth every 4 hours as needed for mild pain 20 tablet 0     Cholecalciferol (VITAMIN D3) 1000 units CAPS Take 1,000 Units by mouth every morning        Flaxseed, Linseed, (FLAXSEED OIL) 1000 MG CAPS Take 1,000 mg by mouth At Bedtime        ibuprofen (ADVIL/MOTRIN) 400 MG tablet Take 400 mg by mouth as needed        Multiple Vitamins-Minerals (MULTIVITAMIN ADULTS) TABS Take 1 tablet by mouth At Bedtime       oxyCODONE IR (ROXICODONE) 5 MG tablet Take 1-2 tablets (5-10 mg) by mouth every 4 hours as needed for pain or other (Moderate to Severe) (Patient not taking: Reported on 4/27/2018) 20 tablet 0        No Known Allergies            "

## 2018-04-27 NOTE — PROGRESS NOTES
Chief Complaint: Calf pain, right    HPI: Lucy is a 32-year-old female who is here 1 week status post excision of right calf mass by Dr. Avery.  She reports that overall she was doing well and went back to work the next day.  However 2 days ago, she started developing increasing calf pain and bruising down the back of her calf and lower leg.  She denies any trauma.  She did walk around a fair amount this weekend.  She was off her pain medicine but had to restart the pain medicine on Wednesday, as well as using one crutch again.  She denies any other symptoms.  No other concerns.    Physical Exam: Lucy is a pleasant 32-year-old female who is alert and oriented in no apparent distress. She has an antalgic gait with one crutch today.  Her right calf incision is healing well.  Steri-Strips are still in place.  There is no erythema or drainage.  She does have an area of firmness just distal to the incision.  There is significant ecchymosis from that area down towards the Achilles tendon.  She is tender to palpation of the posterior calf.  He has some pain with range of motion of the ankle in the calf area.  There is moderate swelling.  She is otherwise neurovascularly intact.    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  Given the vascular nature of the tumor, it may be more susceptible to bleeding in that area with increased activity.  However, there is a slight increased risk of DVT with surgery, so I believe we should do a Doppler ultrasound of the right lower extremity to rule out DVT.  I explained what would happen if the results are positive.  If however it is just a hematoma, I do recommend warm moist compresses and gentle  stretching, and this should improve with time.  We should see her back in 2 weeks for a check of her progress.  She understands and agrees with the plan of care and all questions have been answered.    ADDENDUM:  Patient's doppler US was negative for DVT.  I did call the patient to confirm that with her.  She doesn't understand why she is still having pain and swelling.  She is frustrated by this set back.  I told her she is only a week out from surgery and she may have been too active, and any time there is bleeding, there is swelling, which causes pain and decreased motion.  She should take it easy this next week and do gentle stretching and warm compresses.  If she doesn't see any improvement in the next several days, she can call and come back in sooner, otherwise we will see her in 2 weeks.  She did not seem happy with this explanation but agreed with the plan of care.

## 2018-05-09 ENCOUNTER — OFFICE VISIT (OUTPATIENT)
Dept: ORTHOPEDICS | Facility: CLINIC | Age: 33
End: 2018-05-09
Payer: COMMERCIAL

## 2018-05-09 VITALS — BODY MASS INDEX: 24.51 KG/M2 | HEIGHT: 66 IN | WEIGHT: 152.5 LBS

## 2018-05-09 DIAGNOSIS — D18.00 HEMANGIOMA: Primary | ICD-10-CM

## 2018-05-09 NOTE — LETTER
5/9/2018       RE: Lucy Benson  [address] [facility] [address]     Dear Colleague,    Thank you for referring your patient, Lucy Benson, to the [facility] at [facility]. Please see a copy of my visit note below.    This 32-year-old is about 3 weeks out from a calf hemangioma excision on the right side.  Her incision is dry without any erythema.  She has tape strips over it.  She was concerned that it was  a little bit so would like to leave these as long as possible.  I think that is a reasonable approach.  She is able to walk without a limp today.  She is aware of the small risk of local recurrence.  I will see her back as needed.    Again, thank you for allowing me to participate in the care of your patient.      Sincerely,    Jose Avery MD

## 2018-05-09 NOTE — PROGRESS NOTES
This 32-year-old is about 3 weeks out from a calf hemangioma excision on the right side.  Her incision is dry without any erythema.  She has tape strips over it.  She was concerned that it was  a little bit so would like to leave these as long as possible.  I think that is a reasonable approach.  She is able to walk without a limp today.  She is aware of the small risk of local recurrence.  I will see her back as needed.

## 2018-05-09 NOTE — MR AVS SNAPSHOT
"              After Visit Summary   5/9/2018    Lucy Benson    MRN: [id number]           Patient Information     Date Of Birth          1985        Visit Information        Provider Department      5/9/2018 10:30 AM Jose Avery MD [facility]        Today's Diagnoses     Hemangioma    -  1       Follow-ups after your visit        Your next 10 appointments already scheduled     May 09, 2018 10:30 AM CDT   (Arrive by 10:15 AM)   Return Visit with MD [facility] ([facility])    [address] [place] [id number] [phone]              Who to contact     Please call your clinic at [phone] to:    Ask questions about your health    Make or cancel appointments    Discuss your medicines    Learn about your test results    Speak to your doctor            Additional Information About Your Visit        MyChart Information     [employer] gives you secure access to your electronic health record. If you see a primary care provider, you can also send messages to your care team and make appointments. If you have questions, please call your primary care clinic.  If you do not have a primary care provider, please call [phone] and they will assist you.      [employer] is an electronic gateway that provides easy, online access to your medical records. With [employer], you can request a clinic appointment, read your test results, renew a prescription or communicate with your care team.     To access your existing account, please contact your [facility] or call [phone] for assistance.        Care EveryWhere ID     This is your Care EveryWhere ID. This could be used by other organizations to access your [place] medical records  [id number]        Your Vitals Were     Height BMI (Body Mass Index)                1.676 m (5' 5.98\") 24.63 kg/m2           Blood Pressure " from Last 3 Encounters:   04/19/18 107/68   04/18/18 122/74    Weight from Last 3 Encounters:   05/09/18 69.2 kg (152 lb 8 oz)   04/27/18 67.9 kg (149 lb 9.6 oz)   04/19/18 67.1 kg (148 lb)              Today, you had the following     No orders found for display       Primary Care Provider Office Phone # Fax #    Mohini Santos [phone]       [facility] [address]  [facility] [address]        Equal Access to Services     [facility]: Hadii aad ku hadkelseyo Someagan, waaxda luqadaha, qaybta kaalmada clay, veronica berman . So [facility] [phone].    ATENCIÓN: Si habla español, tiene a white disposición servicios gratuitos de asistencia lingüística. [facility] [phone].    We comply with applicable federal civil rights laws and [place] laws. We do not discriminate on the basis of race, color, national origin, age, disability, sex, sexual orientation, or gender identity.            Thank you!     Thank you for choosing [facility]  for your care. Our goal is always to provide you with excellent care. Hearing back from our patients is one way we can continue to improve our services. Please take a few minutes to complete the written survey that you may receive in the mail after your visit with us. Thank you!             Your Updated Medication List - Protect others around you: Learn how to safely use, store and throw away your medicines at www.disposemymeds.org.          This list is accurate as of 5/9/18 10:15 AM.  Always use your most recent med list.                   Brand Name Dispense Instructions for use Diagnosis    acetaminophen-codeine 300-30 MG per tablet    TYLENOL WITH CODEINE #3    20 tablet    Take 1-2 tablets by mouth every 4 hours as needed for mild pain    Soft tissue mass       flaxseed oil 1000 MG Caps      Take 1,000 mg by mouth At Bedtime        ibuprofen 400 MG tablet    ADVIL/MOTRIN     Take 400 mg by mouth as needed         MULTIVITAMIN ADULTS Tabs      Take 1 tablet by mouth At Bedtime        oxyCODONE IR 5 MG tablet    ROXICODONE    20 tablet    Take 1-2 tablets (5-10 mg) by mouth every 4 hours as needed for pain or other (Moderate to Severe)    Soft tissue mass       vitamin D3 1000 units Caps      Take 1,000 Units by mouth every morning

## 2018-05-09 NOTE — NURSING NOTE
"Reason For Visit:   Chief Complaint   Patient presents with     Surgical Followup     S/P Excision Right Calf Mass. DOS: 04/19/2018       Pain Assessment  Patient Currently in Pain: No               HEIGHT: 5' 5.98\", WEIGHT: 152 lbs 8 oz, BMI: Body mass index is 24.63 kg/(m^2).      Current Outpatient Prescriptions   Medication Sig Dispense Refill     acetaminophen-codeine (TYLENOL WITH CODEINE #3) 300-30 MG per tablet Take 1-2 tablets by mouth every 4 hours as needed for mild pain 20 tablet 0     Cholecalciferol (VITAMIN D3) 1000 units CAPS Take 1,000 Units by mouth every morning        Flaxseed, Linseed, (FLAXSEED OIL) 1000 MG CAPS Take 1,000 mg by mouth At Bedtime        ibuprofen (ADVIL/MOTRIN) 400 MG tablet Take 400 mg by mouth as needed        Multiple Vitamins-Minerals (MULTIVITAMIN ADULTS) TABS Take 1 tablet by mouth At Bedtime       oxyCODONE IR (ROXICODONE) 5 MG tablet Take 1-2 tablets (5-10 mg) by mouth every 4 hours as needed for pain or other (Moderate to Severe) (Patient not taking: Reported on 4/27/2018) 20 tablet 0        No Known Allergies            "

## 2019-05-11 NOTE — TELEPHONE ENCOUNTER
[facility] Call Center    Phone Message    May a detailed message be left on voicemail: no    Reason for Call: Other: Pt just had surgery with Dr. Avery last week and has questions about her leg tightening symptoms.  Pt feels like she may have pulled something     Action Taken: Message routed to:  [facility]   Full range of motion of upper and lower extremities, no joint tenderness/swelling.

## 2020-03-11 ENCOUNTER — HEALTH MAINTENANCE LETTER (OUTPATIENT)
Age: 35
End: 2020-03-11

## 2021-01-03 ENCOUNTER — HEALTH MAINTENANCE LETTER (OUTPATIENT)
Age: 36
End: 2021-01-03

## 2021-04-25 ENCOUNTER — HEALTH MAINTENANCE LETTER (OUTPATIENT)
Age: 36
End: 2021-04-25

## 2021-10-10 ENCOUNTER — HEALTH MAINTENANCE LETTER (OUTPATIENT)
Age: 36
End: 2021-10-10

## 2022-05-21 ENCOUNTER — HEALTH MAINTENANCE LETTER (OUTPATIENT)
Age: 37
End: 2022-05-21

## 2022-09-18 ENCOUNTER — HEALTH MAINTENANCE LETTER (OUTPATIENT)
Age: 37
End: 2022-09-18

## 2023-06-04 ENCOUNTER — HEALTH MAINTENANCE LETTER (OUTPATIENT)
Age: 38
End: 2023-06-04

## 2024-11-30 ENCOUNTER — HEALTH MAINTENANCE LETTER (OUTPATIENT)
Age: 39
End: 2024-11-30

## 2025-01-03 ENCOUNTER — ANCILLARY PROCEDURE (OUTPATIENT)
Dept: GENERAL RADIOLOGY | Facility: CLINIC | Age: 40
End: 2025-01-03
Attending: PEDIATRICS
Payer: COMMERCIAL

## 2025-01-03 DIAGNOSIS — G89.29 CHRONIC PAIN OF LEFT KNEE: ICD-10-CM

## 2025-01-03 DIAGNOSIS — M25.562 CHRONIC PAIN OF LEFT KNEE: ICD-10-CM

## 2025-01-03 PROCEDURE — 73562 X-RAY EXAM OF KNEE 3: CPT | Mod: TC | Performed by: STUDENT IN AN ORGANIZED HEALTH CARE EDUCATION/TRAINING PROGRAM

## 2025-05-19 ENCOUNTER — ANCILLARY PROCEDURE (OUTPATIENT)
Dept: MRI IMAGING | Facility: CLINIC | Age: 40
End: 2025-05-19
Attending: ORTHOPAEDIC SURGERY
Payer: COMMERCIAL

## 2025-05-19 DIAGNOSIS — G89.29 CHRONIC PAIN OF LEFT KNEE: ICD-10-CM

## 2025-05-19 DIAGNOSIS — M25.562 CHRONIC PAIN OF LEFT KNEE: ICD-10-CM

## 2025-05-19 PROCEDURE — 73721 MRI JNT OF LWR EXTRE W/O DYE: CPT | Mod: LT | Performed by: RADIOLOGY

## 2025-05-20 ENCOUNTER — TELEPHONE (OUTPATIENT)
Dept: ORTHOPEDICS | Facility: CLINIC | Age: 40
End: 2025-05-20
Payer: COMMERCIAL

## 2025-05-20 NOTE — TELEPHONE ENCOUNTER
Called and spoke to hero that after review of her knee MRI, nothing obvious torn or injured in the knee that would require surgery. Discussed Physical Therapy, home exercise program, consideration of a pes injection in the future as needed. She states she has Physical Therapy set up for next month and will start there. She was appreciative of the call.    * All questions were addressed and answered . The patient acknowledges an understanding of and agreement with the plan set forth. Patient was advised to call our office or MyChart us if any further questions arise in the future.      Tanner Fortune M.D., M.S.  Dept. of Orthopaedic Surgery  [facility]      MRI left knee 5/19/2025                                                                 IMPRESSION:  No findings to suggest internal derangement in the left knee joint.  The anterior and posterior cruciate ligaments, medial and lateral  supporting structures, and bilateral menisci are intact. No high-grade  cartilage loss.

## 2025-06-12 ENCOUNTER — THERAPY VISIT (OUTPATIENT)
Dept: PHYSICAL THERAPY | Facility: CLINIC | Age: 40
End: 2025-06-12
Attending: ORTHOPAEDIC SURGERY
Payer: COMMERCIAL

## 2025-06-12 DIAGNOSIS — G89.29 CHRONIC PAIN OF LEFT KNEE: Primary | ICD-10-CM

## 2025-06-12 DIAGNOSIS — M25.562 CHRONIC PAIN OF LEFT KNEE: Primary | ICD-10-CM

## 2025-07-24 ENCOUNTER — ANCILLARY PROCEDURE (OUTPATIENT)
Dept: GENERAL RADIOLOGY | Facility: CLINIC | Age: 40
End: 2025-07-24
Attending: FAMILY MEDICINE
Payer: COMMERCIAL

## 2025-07-24 ENCOUNTER — OFFICE VISIT (OUTPATIENT)
Dept: ORTHOPEDICS | Facility: CLINIC | Age: 40
End: 2025-07-24
Payer: COMMERCIAL

## 2025-07-24 ENCOUNTER — PRE VISIT (OUTPATIENT)
Dept: ORTHOPEDICS | Facility: CLINIC | Age: 40
End: 2025-07-24

## 2025-07-24 DIAGNOSIS — S86.892A MEDIAL TIBIAL STRESS SYNDROME, LEFT, INITIAL ENCOUNTER: Primary | ICD-10-CM

## 2025-07-24 DIAGNOSIS — M79.662 PAIN IN LEFT LOWER LEG: ICD-10-CM

## 2025-07-24 SDOH — HEALTH STABILITY: PHYSICAL HEALTH: ON AVERAGE, HOW MANY DAYS PER WEEK DO YOU ENGAGE IN MODERATE TO STRENUOUS EXERCISE (LIKE A BRISK WALK)?: 7 DAYS

## 2025-07-24 NOTE — LETTER
7/24/2025      RE: Lucy Benson  [address] [facility] [address]     Dear Colleague,    Thank you for referring your patient, Lucy Benson, to the [facility] [place]. Please see a copy of my visit note below.    Sports Medicine Clinic Visit    PCP: Mohini Santos    Lucy Benson is a 39 year old female who is seen  as self referral presenting with left lower leg pain.     Injury: No specific ADRIEN; returning to run program currently, increasing running mileage/intensity.  Patient with been working with physical therapist early in the summer and lower extremity alignment and glutes strength.  Patient has been running on a treadmill.  In the last 6 to 8 weeks she has been able to increase running to daily on the treadmill for 1 mile at a time.  She is not involved in running apart from this.  She keeps the treadmill flat.  No other workouts currently.    Location of Pain: left medial lower leg   Duration of Pain: 1 month(s)  Pain is better with: Rest  Pain is worse with: Running, but also a constant ache   Additional Features: None   Treatment so far consists of: Nothing  Prior History of related problems: 4th and 5th met stress fracture 10 years ago     There were no vitals taken for this visit.       Pt chart h/o rheumatoid arthritis, treated with Plaquenil by rheumatology 2014, [facility]      MRI of the left knee 5/19/2025 showed intact medial meniscus, intact lateral meniscus.  No evidence of cartilage loss or subchondral edema in the weightbearing compartment.  No high-grade cartilage loss in the patellofemoral compartment.  ACL and PCL intact.  No popliteal cyst or joint effusion.      Surgical Hx:  has a past surgical history that includes Foot surgery; orthopedic surgery; Dental surgery; Dental surgery; ENT surgery; and Excise mass lower extremity (Right, 4/19/2018).     SH: seated job, medical reimbursement           Imaging studies below reviewed  by me:    EXAMINATION: MRI of the left knee without contrast     DATE:  5/19/2025     HISTORY: Chronic pain     TECHNIQUE: Multiplanar multisequence MR imaging of the left knee was  obtained using standard sequences in 3 orthogonal planes without the  use of intravenous or intra-articular gadolinium contrast.     Comparison: Comparison radiographs dated 1/3/2025 were reviewed, which  demonstrated no acute bone abnormality.     FINDINGS:     In the medial compartment, the medial meniscus is intact. There is no  high-grade or full-thickness cartilage loss or subchondral edema.     In the lateral compartment, the lateral meniscus is intact. There is  no high-grade or full-thickness cartilage loss or subchondral edema.     In the patellofemoral compartment, there is no high-grade or  full-thickness cartilage loss or subchondral edema.     The anterior and posterior cruciate ligaments are intact.     The tibial collateral ligament is intact. The anterior iliotibial  band, fibular collateral ligament, biceps femoris tendon, and  popliteus tendons are intact..      There is no significant joint effusion. No popliteal cyst. No joint  bodies.     The extensor mechanism is intact and normal in appearance.                                                                       IMPRESSION:  No findings to suggest internal derangement in the left knee joint.  The anterior and posterior cruciate ligaments, medial and lateral  supporting structures, and bilateral menisci are intact. No high-grade  cartilage loss.     ISABELLA SAUCEDO MD         XR KNEE STANDING AP SUNRISE BILAT LAT LEFT   1/3/2025 8:16 AM      HISTORY: Anterior-medial patellar tendon pain of left knee, chronic;  Chronic pain of left knee; Chronic pain of left knee  COMPARISON: None.                                                                       IMPRESSION:   Right: Negative.     Left: Normal joint spaces and alignment. No acute fracture or knee  joint  effusion.     MIRIAM ALANIS MD           PMH:  Past Medical History:   Diagnosis Date     History of ectopic pregnancy      PONV (postoperative nausea and vomiting)      RA (rheumatoid arthritis) (H) 2013     Soft tissue mass     Right leg.       Active problem list:  Patient Active Problem List   Diagnosis     Dysuria     Hammer toe of left foot     Neuroma     Vitamin D deficiency     Chronic pain of left knee       FH:  Family History   Problem Relation Age of Onset     Diabetes Father         TYPE 2     Obesity Father      Hypertension Mother      Rheumatoid Arthritis Maternal Grandmother      No Known Problems Brother      No Known Problems Maternal Grandfather      Lung Cancer Paternal Grandmother      Medical History Unknown Paternal Grandfather        SH:  Social History     Socioeconomic History     Marital status: Single     Spouse name: Not on file     Number of children: Not on file     Years of education: Not on file     Highest education level: Not on file   Occupational History     Not on file   Tobacco Use     Smoking status: Never     Smokeless tobacco: Never   Substance and Sexual Activity     Alcohol use: No     Drug use: No     Sexual activity: Never   Other Topics Concern     Not on file   Social History Narrative     Not on file     Social Drivers of Health     Financial Resource Strain: High Risk (1/1/2022)    Received from [employer]    Financial Resource Strain      Difficulty of Paying Living Expenses: Not on file      Difficulty of Paying Living Expenses: Not on file   Food Insecurity: Not on file   Transportation Needs: Not on file   Physical Activity: Not on file   Stress: Not on file   Social Connections: Unknown (1/1/2022)    Received from [employer]    Social Connections      Frequency of Communication with Friends and Family: Not on file   Interpersonal Safety: Low Risk  (6/6/2025)    Interpersonal Safety       Do you feel physically and emotionally safe where you currently live?: Yes      Within the past 12 months, have you been hit, slapped, kicked or otherwise physically hurt by someone?: No      Within the past 12 months, have you been humiliated or emotionally abused in other ways by your partner or ex-partner?: No   Housing Stability: Not on file       MEDS:  See EMR, reviewed  ALL:  See EMR, reviewed    REVIEW OF SYSTEMS:  CONSTITUTIONAL:NEGATIVE for fever, chills, change in weight  INTEGUMENTARY/SKIN: NEGATIVE for worrisome rashes, moles or lesions  EYES: NEGATIVE for vision changes or irritation  ENT/MOUTH: NEGATIVE for ear, mouth and throat problems  RESP:NEGATIVE for significant cough or SOB  BREAST: NEGATIVE for masses, tenderness or discharge  CV: NEGATIVE for chest pain, palpitations or peripheral edema  GI: NEGATIVE for nausea, abdominal pain, heartburn, or change in bowel habits  :NEGATIVE for frequency, dysuria, or hematuria  :NEGATIVE for frequency, dysuria, or hematuria  NEURO: NEGATIVE for weakness, dizziness or paresthesias  ENDOCRINE: NEGATIVE for temperature intolerance, skin/hair changes  HEME/ALLERGY/IMMUNE: NEGATIVE for bleeding problems  PSYCHIATRIC: NEGATIVE for changes in mood or affect        Objective: Patient is tender in a broad expanse of 6 to 8 cm along the course of the medial left tibia.  Nontender at the fibula.  Nontender about the calf or Achilles tendon.  Has a tendency towards a pronated heel bilaterally.  Overlying skin is normal.  Appropriate conversation and affect.    X-ray left tibia shows no signs of periosteal reaction or stress fracture.    Assessment: Medial tibial stress syndrome.  Tendency towards pronated heels.    Plan: We discussed relative rest over the next 4 weeks, until no longer tender along the course of the medial tibia.,  We discussed a reasonable progression of return to sport, and crosstraining options.  We went over some online crosstraining options to  consider.  Localized massage show in the tibia.  We discussed shoes with a strong heel cup and a straight last.  Patient had no further questions and will follow-up as needed.                      Again, thank you for allowing me to participate in the care of your patient.      Sincerely,    Jim Cole MD

## 2025-07-24 NOTE — PROGRESS NOTES
Sports Medicine Clinic Visit    PCP: Mohini Santos    Lucy Benson is a 39 year old female who is seen  as self referral presenting with left lower leg pain.     Injury: No specific ADRIEN; returning to run program currently, increasing running mileage/intensity.  Patient with been working with physical therapist early in the summer and lower extremity alignment and glutes strength.  Patient has been running on a treadmill.  In the last 6 to 8 weeks she has been able to increase running to daily on the treadmill for 1 mile at a time.  She is not involved in running apart from this.  She keeps the treadmill flat.  No other workouts currently.    Location of Pain: left medial lower leg   Duration of Pain: 1 month(s)  Pain is better with: Rest  Pain is worse with: Running, but also a constant ache   Additional Features: None   Treatment so far consists of: Nothing  Prior History of related problems: 4th and 5th met stress fracture 10 years ago     There were no vitals taken for this visit.       Pt chart h/o rheumatoid arthritis, treated with Plaquenil by rheumatology 2014, [facility]      MRI of the left knee 5/19/2025 showed intact medial meniscus, intact lateral meniscus.  No evidence of cartilage loss or subchondral edema in the weightbearing compartment.  No high-grade cartilage loss in the patellofemoral compartment.  ACL and PCL intact.  No popliteal cyst or joint effusion.      Surgical Hx:  has a past surgical history that includes Foot surgery; orthopedic surgery; Dental surgery; Dental surgery; ENT surgery; and Excise mass lower extremity (Right, 4/19/2018).     SH: seated job, medical reimbursement           Imaging studies below reviewed by me:    EXAMINATION: MRI of the left knee without contrast     DATE:  5/19/2025     HISTORY: Chronic pain     TECHNIQUE: Multiplanar multisequence MR imaging of the left knee was  obtained using standard sequences in 3 orthogonal planes without the  use of  intravenous or intra-articular gadolinium contrast.     Comparison: Comparison radiographs dated 1/3/2025 were reviewed, which  demonstrated no acute bone abnormality.     FINDINGS:     In the medial compartment, the medial meniscus is intact. There is no  high-grade or full-thickness cartilage loss or subchondral edema.     In the lateral compartment, the lateral meniscus is intact. There is  no high-grade or full-thickness cartilage loss or subchondral edema.     In the patellofemoral compartment, there is no high-grade or  full-thickness cartilage loss or subchondral edema.     The anterior and posterior cruciate ligaments are intact.     The tibial collateral ligament is intact. The anterior iliotibial  band, fibular collateral ligament, biceps femoris tendon, and  popliteus tendons are intact..      There is no significant joint effusion. No popliteal cyst. No joint  bodies.     The extensor mechanism is intact and normal in appearance.                                                                       IMPRESSION:  No findings to suggest internal derangement in the left knee joint.  The anterior and posterior cruciate ligaments, medial and lateral  supporting structures, and bilateral menisci are intact. No high-grade  cartilage loss.     ISABELLA SAUCEDO MD         XR KNEE STANDING AP SUNRISE BILAT LAT LEFT   1/3/2025 8:16 AM      HISTORY: Anterior-medial patellar tendon pain of left knee, chronic;  Chronic pain of left knee; Chronic pain of left knee  COMPARISON: None.                                                                       IMPRESSION:   Right: Negative.     Left: Normal joint spaces and alignment. No acute fracture or knee  joint effusion.     MIRIAM ALANIS MD           PMH:  Past Medical History:   Diagnosis Date    History of ectopic pregnancy     PONV (postoperative nausea and vomiting)     RA (rheumatoid arthritis) (H) 2013    Soft tissue mass     Right leg.       Active problem  list:  Patient Active Problem List   Diagnosis    Dysuria    Hammer toe of left foot    Neuroma    Vitamin D deficiency    Chronic pain of left knee       FH:  Family History   Problem Relation Age of Onset    Diabetes Father         TYPE 2    Obesity Father     Hypertension Mother     Rheumatoid Arthritis Maternal Grandmother     No Known Problems Brother     No Known Problems Maternal Grandfather     Lung Cancer Paternal Grandmother     Medical History Unknown Paternal Grandfather        SH:  Social History     Socioeconomic History    Marital status: Single     Spouse name: Not on file    Number of children: Not on file    Years of education: Not on file    Highest education level: Not on file   Occupational History    Not on file   Tobacco Use    Smoking status: Never    Smokeless tobacco: Never   Substance and Sexual Activity    Alcohol use: No    Drug use: No    Sexual activity: Never   Other Topics Concern    Not on file   Social History Narrative    Not on file     Social Drivers of Health     Financial Resource Strain: High Risk (1/1/2022)    Received from [facility]    Financial Resource Strain     Difficulty of Paying Living Expenses: Not on file     Difficulty of Paying Living Expenses: Not on file   Food Insecurity: Not on file   Transportation Needs: Not on file   Physical Activity: Not on file   Stress: Not on file   Social Connections: Unknown (1/1/2022)    Received from [facility]    Social Connections     Frequency of Communication with Friends and Family: Not on file   Interpersonal Safety: Low Risk  (6/6/2025)    Interpersonal Safety     Do you feel physically and emotionally safe where you currently live?: Yes     Within the past 12 months, have you been hit, slapped, kicked or otherwise physically hurt by someone?: No     Within the past 12 months, have you been humiliated or emotionally abused in other ways by your partner or  ex-partner?: No   Housing Stability: Not on file       MEDS:  See EMR, reviewed  ALL:  See EMR, reviewed    REVIEW OF SYSTEMS:  CONSTITUTIONAL:NEGATIVE for fever, chills, change in weight  INTEGUMENTARY/SKIN: NEGATIVE for worrisome rashes, moles or lesions  EYES: NEGATIVE for vision changes or irritation  ENT/MOUTH: NEGATIVE for ear, mouth and throat problems  RESP:NEGATIVE for significant cough or SOB  BREAST: NEGATIVE for masses, tenderness or discharge  CV: NEGATIVE for chest pain, palpitations or peripheral edema  GI: NEGATIVE for nausea, abdominal pain, heartburn, or change in bowel habits  :NEGATIVE for frequency, dysuria, or hematuria  :NEGATIVE for frequency, dysuria, or hematuria  NEURO: NEGATIVE for weakness, dizziness or paresthesias  ENDOCRINE: NEGATIVE for temperature intolerance, skin/hair changes  HEME/ALLERGY/IMMUNE: NEGATIVE for bleeding problems  PSYCHIATRIC: NEGATIVE for changes in mood or affect        Objective: Patient is tender in a broad expanse of 6 to 8 cm along the course of the medial left tibia.  Nontender at the fibula.  Nontender about the calf or Achilles tendon.  Has a tendency towards a pronated heel bilaterally.  Overlying skin is normal.  Appropriate conversation and affect.    X-ray left tibia shows no signs of periosteal reaction or stress fracture.    Assessment: Medial tibial stress syndrome.  Tendency towards pronated heels.    Plan: We discussed relative rest over the next 4 weeks, until no longer tender along the course of the medial tibia.,  We discussed a reasonable progression of return to sport, and crosstraining options.  We went over some online crosstraining options to consider.  Localized massage show in the tibia.  We discussed shoes with a strong heel cup and a straight last.  Patient had no further questions and will follow-up as needed.

## (undated) DEVICE — SUCTION MANIFOLD NEPTUNE 2 SYS 1 PORT 702-025-000

## (undated) DEVICE — ESU GROUND PAD ADULT W/CORD E7507

## (undated) DEVICE — TOURNIQUET SGL  BLADDER 30"X4" BLUE 5921030135

## (undated) DEVICE — GLOVE PROTEXIS BLUE W/NEU-THERA 7.5  2D73EB75

## (undated) DEVICE — PACK LOWER EXTREMITY CUSTOM ASC

## (undated) DEVICE — PREP SKIN SCRUB TRAY 4461A

## (undated) DEVICE — GLOVE PROTEXIS W/NEU-THERA 7.0  2D73TE70

## (undated) DEVICE — GLOVE PROTEXIS POWDER FREE SMT 7.0  2D72PT70X

## (undated) DEVICE — SU VICRYL 2-0 SH 27" UND J417H

## (undated) DEVICE — GOWN XLG DISP 9545

## (undated) DEVICE — SOL NACL 0.9% IRRIG 1000ML BOTTLE 2F7124

## (undated) DEVICE — SU MONOCRYL 4-0 PS-2 18" UND Y496G

## (undated) DEVICE — PREP POVIDONE IODINE SCRUB 7.5% 120ML

## (undated) DEVICE — PREP DURAPREP REMOVER 4OZ 8611

## (undated) DEVICE — PREP DURAPREP 26ML APL 8630

## (undated) DEVICE — DRAPE STERI U 1015

## (undated) RX ORDER — ONDANSETRON 2 MG/ML
INJECTION INTRAMUSCULAR; INTRAVENOUS
Status: DISPENSED
Start: 2018-04-19

## (undated) RX ORDER — FENTANYL CITRATE 50 UG/ML
INJECTION, SOLUTION INTRAMUSCULAR; INTRAVENOUS
Status: DISPENSED
Start: 2018-04-19

## (undated) RX ORDER — LIDOCAINE HYDROCHLORIDE 20 MG/ML
INJECTION, SOLUTION EPIDURAL; INFILTRATION; INTRACAUDAL; PERINEURAL
Status: DISPENSED
Start: 2018-04-19

## (undated) RX ORDER — PROPOFOL 10 MG/ML
INJECTION, EMULSION INTRAVENOUS
Status: DISPENSED
Start: 2018-04-19

## (undated) RX ORDER — CEFAZOLIN SODIUM 1 G/3ML
INJECTION, POWDER, FOR SOLUTION INTRAMUSCULAR; INTRAVENOUS
Status: DISPENSED
Start: 2018-04-19

## (undated) RX ORDER — ACETAMINOPHEN 325 MG/1
TABLET ORAL
Status: DISPENSED
Start: 2018-04-19

## (undated) RX ORDER — DEXAMETHASONE SODIUM PHOSPHATE 4 MG/ML
INJECTION, SOLUTION INTRA-ARTICULAR; INTRALESIONAL; INTRAMUSCULAR; INTRAVENOUS; SOFT TISSUE
Status: DISPENSED
Start: 2018-04-19

## (undated) RX ORDER — OXYCODONE HYDROCHLORIDE 5 MG/1
TABLET ORAL
Status: DISPENSED
Start: 2018-04-19

## (undated) RX ORDER — BUPIVACAINE HYDROCHLORIDE 2.5 MG/ML
INJECTION, SOLUTION EPIDURAL; INFILTRATION; INTRACAUDAL
Status: DISPENSED
Start: 2018-04-19

## (undated) RX ORDER — GABAPENTIN 300 MG/1
CAPSULE ORAL
Status: DISPENSED
Start: 2018-04-19

## (undated) RX ORDER — SCOLOPAMINE TRANSDERMAL SYSTEM 1 MG/1
PATCH, EXTENDED RELEASE TRANSDERMAL
Status: DISPENSED
Start: 2018-04-19

## (undated) RX ORDER — KETOROLAC TROMETHAMINE 30 MG/ML
INJECTION, SOLUTION INTRAMUSCULAR; INTRAVENOUS
Status: DISPENSED
Start: 2018-04-19